# Patient Record
Sex: MALE | Race: WHITE | NOT HISPANIC OR LATINO | Employment: PART TIME
[De-identification: names, ages, dates, MRNs, and addresses within clinical notes are randomized per-mention and may not be internally consistent; named-entity substitution may affect disease eponyms.]

---

## 2017-08-11 ENCOUNTER — RECORDS - HEALTHEAST (OUTPATIENT)
Dept: ADMINISTRATIVE | Facility: OTHER | Age: 67
End: 2017-08-11

## 2017-08-11 LAB
LAB AP CHARGES (HE HISTORICAL CONVERSION): NORMAL
PATH REPORT.COMMENTS IMP SPEC: NORMAL
PATH REPORT.FINAL DX SPEC: NORMAL
PATH REPORT.GROSS SPEC: NORMAL
PATH REPORT.MICROSCOPIC SPEC OTHER STN: NORMAL
PATH REPORT.RELEVANT HX SPEC: NORMAL
RESULT FLAG (HE HISTORICAL CONVERSION): NORMAL

## 2018-11-28 ENCOUNTER — RECORDS - HEALTHEAST (OUTPATIENT)
Dept: LAB | Facility: CLINIC | Age: 68
End: 2018-11-28

## 2018-11-28 LAB
ALBUMIN SERPL-MCNC: 3.7 G/DL (ref 3.5–5)
ALP SERPL-CCNC: 70 U/L (ref 45–120)
ALT SERPL W P-5'-P-CCNC: 39 U/L (ref 0–45)
ANION GAP SERPL CALCULATED.3IONS-SCNC: 7 MMOL/L (ref 5–18)
AST SERPL W P-5'-P-CCNC: 29 U/L (ref 0–40)
BILIRUB SERPL-MCNC: 0.8 MG/DL (ref 0–1)
BUN SERPL-MCNC: 22 MG/DL (ref 8–22)
CALCIUM SERPL-MCNC: 9.6 MG/DL (ref 8.5–10.5)
CHLORIDE BLD-SCNC: 105 MMOL/L (ref 98–107)
CHOLEST SERPL-MCNC: 274 MG/DL
CO2 SERPL-SCNC: 30 MMOL/L (ref 22–31)
CREAT SERPL-MCNC: 0.97 MG/DL (ref 0.7–1.3)
FASTING STATUS PATIENT QL REPORTED: ABNORMAL
GFR SERPL CREATININE-BSD FRML MDRD: >60 ML/MIN/1.73M2
GLUCOSE BLD-MCNC: 89 MG/DL (ref 70–125)
HDLC SERPL-MCNC: 58 MG/DL
LDLC SERPL CALC-MCNC: 184 MG/DL
POTASSIUM BLD-SCNC: 4.5 MMOL/L (ref 3.5–5)
PROT SERPL-MCNC: 6.9 G/DL (ref 6–8)
PSA SERPL-MCNC: 4.6 NG/ML (ref 0–4.5)
SODIUM SERPL-SCNC: 142 MMOL/L (ref 136–145)
TRIGL SERPL-MCNC: 161 MG/DL

## 2018-12-07 ENCOUNTER — AMBULATORY - HEALTHEAST (OUTPATIENT)
Dept: PHYSICAL MEDICINE AND REHAB | Facility: CLINIC | Age: 68
End: 2018-12-07

## 2018-12-07 DIAGNOSIS — M54.31 SCIATICA OF RIGHT SIDE: ICD-10-CM

## 2018-12-10 ENCOUNTER — HOSPITAL ENCOUNTER (OUTPATIENT)
Dept: PHYSICAL MEDICINE AND REHAB | Facility: CLINIC | Age: 68
Discharge: HOME | End: 2018-12-10
Attending: PHYSICAL MEDICINE & REHABILITATION

## 2018-12-10 DIAGNOSIS — G89.29 CHRONIC BILATERAL LOW BACK PAIN WITH RIGHT-SIDED SCIATICA: ICD-10-CM

## 2018-12-10 DIAGNOSIS — M54.41 CHRONIC BILATERAL LOW BACK PAIN WITH RIGHT-SIDED SCIATICA: ICD-10-CM

## 2018-12-10 DIAGNOSIS — M47.816 LUMBAR FACET ARTHROPATHY: ICD-10-CM

## 2018-12-10 DIAGNOSIS — M47.816 LUMBAR FACET JOINT SYNDROME: ICD-10-CM

## 2018-12-10 DIAGNOSIS — M51.369 ANNULAR TEAR OF LUMBAR DISC: ICD-10-CM

## 2018-12-10 ASSESSMENT — MIFFLIN-ST. JEOR: SCORE: 1548.71

## 2018-12-11 ENCOUNTER — COMMUNICATION - HEALTHEAST (OUTPATIENT)
Dept: PHYSICAL MEDICINE AND REHAB | Facility: CLINIC | Age: 68
End: 2018-12-11

## 2018-12-11 DIAGNOSIS — G89.29 CHRONIC BILATERAL LOW BACK PAIN WITH RIGHT-SIDED SCIATICA: ICD-10-CM

## 2018-12-11 DIAGNOSIS — M54.41 CHRONIC BILATERAL LOW BACK PAIN WITH RIGHT-SIDED SCIATICA: ICD-10-CM

## 2018-12-12 ENCOUNTER — RECORDS - HEALTHEAST (OUTPATIENT)
Dept: ADMINISTRATIVE | Facility: OTHER | Age: 68
End: 2018-12-12

## 2019-01-14 ENCOUNTER — COMMUNICATION - HEALTHEAST (OUTPATIENT)
Dept: PHYSICAL MEDICINE AND REHAB | Facility: CLINIC | Age: 69
End: 2019-01-14

## 2019-01-24 ENCOUNTER — HOSPITAL ENCOUNTER (OUTPATIENT)
Dept: PHYSICAL MEDICINE AND REHAB | Facility: CLINIC | Age: 69
Discharge: HOME | End: 2019-01-24
Attending: PAIN MEDICINE

## 2019-01-24 ENCOUNTER — HOSPITAL ENCOUNTER (OUTPATIENT)
Dept: PHYSICAL MEDICINE AND REHAB | Facility: CLINIC | Age: 69
Discharge: HOME | End: 2019-01-24
Attending: NURSE PRACTITIONER

## 2019-01-24 DIAGNOSIS — M54.41 CHRONIC BILATERAL LOW BACK PAIN WITH RIGHT-SIDED SCIATICA: ICD-10-CM

## 2019-01-24 DIAGNOSIS — M47.816 LUMBAR FACET ARTHROPATHY: ICD-10-CM

## 2019-01-24 DIAGNOSIS — M51.369 ANNULAR TEAR OF LUMBAR DISC: ICD-10-CM

## 2019-01-24 DIAGNOSIS — M99.83 OTHER BIOMECHANICAL LESIONS OF LUMBAR REGION: ICD-10-CM

## 2019-01-24 DIAGNOSIS — M48.061 LUMBAR FORAMINAL STENOSIS: ICD-10-CM

## 2019-01-24 DIAGNOSIS — G89.29 CHRONIC BILATERAL LOW BACK PAIN WITH RIGHT-SIDED SCIATICA: ICD-10-CM

## 2019-01-24 DIAGNOSIS — M51.369 BULGE OF LUMBAR DISC WITHOUT MYELOPATHY: ICD-10-CM

## 2019-01-24 ASSESSMENT — MIFFLIN-ST. JEOR: SCORE: 1548.71

## 2019-01-25 ENCOUNTER — HOSPITAL ENCOUNTER (OUTPATIENT)
Dept: PHYSICAL MEDICINE AND REHAB | Facility: CLINIC | Age: 69
Discharge: HOME | End: 2019-01-25
Attending: PAIN MEDICINE

## 2019-01-25 ENCOUNTER — COMMUNICATION - HEALTHEAST (OUTPATIENT)
Dept: PHYSICAL MEDICINE AND REHAB | Facility: CLINIC | Age: 69
End: 2019-01-25

## 2019-01-25 DIAGNOSIS — M47.816 FACET ARTHROPATHY, LUMBAR: ICD-10-CM

## 2019-02-08 ENCOUNTER — COMMUNICATION - HEALTHEAST (OUTPATIENT)
Dept: PHYSICAL MEDICINE AND REHAB | Facility: CLINIC | Age: 69
End: 2019-02-08

## 2019-02-08 ENCOUNTER — HOSPITAL ENCOUNTER (OUTPATIENT)
Dept: PHYSICAL MEDICINE AND REHAB | Facility: CLINIC | Age: 69
Discharge: HOME | End: 2019-02-08
Attending: PAIN MEDICINE

## 2019-02-08 DIAGNOSIS — M54.16 LUMBAR RADICULOPATHY: ICD-10-CM

## 2019-09-25 ENCOUNTER — RECORDS - HEALTHEAST (OUTPATIENT)
Dept: LAB | Facility: CLINIC | Age: 69
End: 2019-09-25

## 2019-09-25 LAB — PSA SERPL-MCNC: 5.1 NG/ML (ref 0–4.5)

## 2020-04-28 ENCOUNTER — RECORDS - HEALTHEAST (OUTPATIENT)
Dept: LAB | Facility: CLINIC | Age: 70
End: 2020-04-28

## 2020-04-28 LAB
C REACTIVE PROTEIN LHE: 0.3 MG/DL (ref 0–0.8)
ERYTHROCYTE [SEDIMENTATION RATE] IN BLOOD BY WESTERGREN METHOD: 5 MM/HR (ref 0–15)
RHEUMATOID FACT SERPL-ACNC: <15 IU/ML (ref 0–30)
URATE SERPL-MCNC: 4.9 MG/DL (ref 3–8)

## 2020-05-01 LAB
ANA SER QL: 0.2 U
LYME TOTAL ANTIBODY - HISTORICAL: 0.03 INDEX VALUE

## 2020-08-03 ENCOUNTER — COMMUNICATION - HEALTHEAST (OUTPATIENT)
Dept: SCHEDULING | Facility: CLINIC | Age: 70
End: 2020-08-03

## 2020-10-13 ENCOUNTER — RECORDS - HEALTHEAST (OUTPATIENT)
Dept: LAB | Facility: CLINIC | Age: 70
End: 2020-10-13

## 2020-10-13 LAB
ALBUMIN SERPL-MCNC: 3.9 G/DL (ref 3.5–5)
ALP SERPL-CCNC: 77 U/L (ref 45–120)
ALT SERPL W P-5'-P-CCNC: 54 U/L (ref 0–45)
ANION GAP SERPL CALCULATED.3IONS-SCNC: 8 MMOL/L (ref 5–18)
AST SERPL W P-5'-P-CCNC: 38 U/L (ref 0–40)
BILIRUB SERPL-MCNC: 0.6 MG/DL (ref 0–1)
BUN SERPL-MCNC: 20 MG/DL (ref 8–28)
CALCIUM SERPL-MCNC: 9.8 MG/DL (ref 8.5–10.5)
CHLORIDE BLD-SCNC: 103 MMOL/L (ref 98–107)
CO2 SERPL-SCNC: 30 MMOL/L (ref 22–31)
CREAT SERPL-MCNC: 1.05 MG/DL (ref 0.7–1.3)
GFR SERPL CREATININE-BSD FRML MDRD: >60 ML/MIN/1.73M2
GLUCOSE BLD-MCNC: 89 MG/DL (ref 70–125)
POTASSIUM BLD-SCNC: 5 MMOL/L (ref 3.5–5)
PROT SERPL-MCNC: 7.1 G/DL (ref 6–8)
SODIUM SERPL-SCNC: 141 MMOL/L (ref 136–145)

## 2021-02-02 ENCOUNTER — HOSPITAL ENCOUNTER (OUTPATIENT)
Dept: PHYSICAL MEDICINE AND REHAB | Facility: CLINIC | Age: 71
Discharge: HOME | End: 2021-02-02
Attending: PHYSICAL MEDICINE & REHABILITATION

## 2021-02-02 DIAGNOSIS — G89.29 CHRONIC BILATERAL LOW BACK PAIN WITHOUT SCIATICA: ICD-10-CM

## 2021-02-02 DIAGNOSIS — M54.50 CHRONIC BILATERAL LOW BACK PAIN WITHOUT SCIATICA: ICD-10-CM

## 2021-02-02 DIAGNOSIS — M47.816 LUMBAR FACET JOINT SYNDROME: ICD-10-CM

## 2021-02-02 DIAGNOSIS — M47.816 LUMBAR FACET ARTHROPATHY: ICD-10-CM

## 2021-02-02 RX ORDER — PRIMIDONE 50 MG/1
50 TABLET ORAL 2 TIMES DAILY
Status: SHIPPED | COMMUNITY
Start: 2020-08-24

## 2021-02-02 ASSESSMENT — MIFFLIN-ST. JEOR: SCORE: 1600.87

## 2021-02-04 ENCOUNTER — HOSPITAL ENCOUNTER (OUTPATIENT)
Dept: PHYSICAL MEDICINE AND REHAB | Facility: CLINIC | Age: 71
Discharge: HOME | End: 2021-02-04
Attending: PHYSICAL MEDICINE & REHABILITATION

## 2021-02-04 DIAGNOSIS — M54.50 CHRONIC BILATERAL LOW BACK PAIN WITHOUT SCIATICA: ICD-10-CM

## 2021-02-04 DIAGNOSIS — M47.816 LUMBAR FACET JOINT SYNDROME: ICD-10-CM

## 2021-02-04 DIAGNOSIS — M54.16 LUMBAR RADICULITIS: ICD-10-CM

## 2021-02-04 DIAGNOSIS — M47.816 LUMBAR FACET ARTHROPATHY: ICD-10-CM

## 2021-02-04 DIAGNOSIS — G89.29 CHRONIC BILATERAL LOW BACK PAIN WITHOUT SCIATICA: ICD-10-CM

## 2021-02-04 RX ORDER — ROPINIROLE 1 MG/1
1 TABLET, FILM COATED ORAL 3 TIMES DAILY
Status: SHIPPED | COMMUNITY
Start: 2021-01-15

## 2021-02-04 RX ORDER — DICLOFENAC SODIUM 1 MG/ML
SOLUTION/ DROPS OPHTHALMIC
Status: SHIPPED | COMMUNITY
Start: 2020-12-05

## 2021-02-04 RX ORDER — DORZOLAMIDE HYDROCHLORIDE AND TIMOLOL MALEATE 20; 5 MG/ML; MG/ML
SOLUTION/ DROPS OPHTHALMIC
Status: SHIPPED | COMMUNITY
Start: 2020-10-23

## 2021-02-18 ENCOUNTER — COMMUNICATION - HEALTHEAST (OUTPATIENT)
Dept: PHYSICAL MEDICINE AND REHAB | Facility: CLINIC | Age: 71
End: 2021-02-18

## 2021-02-26 ENCOUNTER — COMMUNICATION - HEALTHEAST (OUTPATIENT)
Dept: PHYSICAL MEDICINE AND REHAB | Facility: CLINIC | Age: 71
End: 2021-02-26

## 2021-03-10 ENCOUNTER — HOSPITAL ENCOUNTER (OUTPATIENT)
Dept: PHYSICAL MEDICINE AND REHAB | Facility: CLINIC | Age: 71
Discharge: HOME | End: 2021-03-10
Attending: PHYSICAL MEDICINE & REHABILITATION

## 2021-03-10 DIAGNOSIS — M54.16 LUMBAR RADICULITIS: ICD-10-CM

## 2021-03-30 ENCOUNTER — HOSPITAL ENCOUNTER (OUTPATIENT)
Dept: PHYSICAL MEDICINE AND REHAB | Facility: CLINIC | Age: 71
Discharge: HOME | End: 2021-03-30
Attending: PHYSICAL MEDICINE & REHABILITATION

## 2021-03-30 DIAGNOSIS — M54.50 CHRONIC BILATERAL LOW BACK PAIN WITHOUT SCIATICA: ICD-10-CM

## 2021-03-30 DIAGNOSIS — M47.816 LUMBAR FACET ARTHROPATHY: ICD-10-CM

## 2021-03-30 DIAGNOSIS — M47.816 LUMBAR FACET JOINT SYNDROME: ICD-10-CM

## 2021-03-30 DIAGNOSIS — M54.16 LUMBAR RADICULITIS: ICD-10-CM

## 2021-03-30 DIAGNOSIS — G60.9 PERIPHERAL NEUROPATHY, IDIOPATHIC: ICD-10-CM

## 2021-03-30 DIAGNOSIS — G89.29 CHRONIC BILATERAL LOW BACK PAIN WITHOUT SCIATICA: ICD-10-CM

## 2021-03-30 RX ORDER — GABAPENTIN 300 MG/1
CAPSULE ORAL
Qty: 270 CAPSULE | Refills: 1 | Status: SHIPPED | OUTPATIENT
Start: 2021-03-30

## 2021-05-12 ENCOUNTER — RECORDS - HEALTHEAST (OUTPATIENT)
Dept: PHYSICAL MEDICINE AND REHAB | Facility: CLINIC | Age: 71
End: 2021-05-12

## 2021-05-12 ENCOUNTER — RECORDS - HEALTHEAST (OUTPATIENT)
Dept: ADMINISTRATIVE | Facility: OTHER | Age: 71
End: 2021-05-12

## 2021-05-12 DIAGNOSIS — M54.2 CERVICAL PAIN: ICD-10-CM

## 2021-05-24 ENCOUNTER — COMMUNICATION - HEALTHEAST (OUTPATIENT)
Dept: PHYSICAL MEDICINE AND REHAB | Facility: CLINIC | Age: 71
End: 2021-05-24

## 2021-05-24 ENCOUNTER — HOSPITAL ENCOUNTER (OUTPATIENT)
Dept: PHYSICAL MEDICINE AND REHAB | Facility: CLINIC | Age: 71
Discharge: HOME | End: 2021-05-24
Attending: PHYSICAL MEDICINE & REHABILITATION

## 2021-05-24 DIAGNOSIS — G89.29 CHRONIC BILATERAL LOW BACK PAIN WITH RIGHT-SIDED SCIATICA: ICD-10-CM

## 2021-05-24 DIAGNOSIS — M54.2 NECK PAIN: ICD-10-CM

## 2021-05-24 DIAGNOSIS — M47.812 ARTHROPATHY OF CERVICAL FACET JOINT: ICD-10-CM

## 2021-05-24 DIAGNOSIS — M54.41 CHRONIC BILATERAL LOW BACK PAIN WITH RIGHT-SIDED SCIATICA: ICD-10-CM

## 2021-05-24 RX ORDER — CELECOXIB 100 MG/1
CAPSULE ORAL
Qty: 60 CAPSULE | Refills: 1 | Status: SHIPPED | OUTPATIENT
Start: 2021-05-24

## 2021-05-24 ASSESSMENT — MIFFLIN-ST. JEOR: SCORE: 1518.76

## 2021-05-25 ENCOUNTER — RECORDS - HEALTHEAST (OUTPATIENT)
Dept: ADMINISTRATIVE | Facility: CLINIC | Age: 71
End: 2021-05-25

## 2021-05-26 ENCOUNTER — RECORDS - HEALTHEAST (OUTPATIENT)
Dept: ADMINISTRATIVE | Facility: CLINIC | Age: 71
End: 2021-05-26

## 2021-05-27 ENCOUNTER — RECORDS - HEALTHEAST (OUTPATIENT)
Dept: ADMINISTRATIVE | Facility: CLINIC | Age: 71
End: 2021-05-27

## 2021-06-02 VITALS — BODY MASS INDEX: 27.2 KG/M2 | HEIGHT: 68 IN | WEIGHT: 179.5 LBS

## 2021-06-02 VITALS — BODY MASS INDEX: 27.28 KG/M2 | WEIGHT: 179.4 LBS

## 2021-06-02 VITALS — WEIGHT: 173.2 LBS | BODY MASS INDEX: 26.33 KG/M2

## 2021-06-02 VITALS — WEIGHT: 179.5 LBS | HEIGHT: 68 IN | BODY MASS INDEX: 27.2 KG/M2

## 2021-06-05 VITALS — BODY MASS INDEX: 28.95 KG/M2 | WEIGHT: 191 LBS | HEIGHT: 68 IN

## 2021-06-14 NOTE — PATIENT INSTRUCTIONS - HE
Bilateral L4-5 and L5-S1 facet joint injections have been ordered today.      Please note that this injection uses cortisone.  The cortisone may somewhat weaken the immune system.  It is unknown how much the immune system is weakened.  It is unknown if it is weakened to the point that you may be more likely to get the COVID-19 virus, or if you do get the COVID-19 virus, if you would be sicker than you would have been if you had not had the cortisone injection.  If you do not wish to proceed with the injection, please let the nurse/physician know and do NOT schedule the injection.    Please note that since your immune system is weakened from the cortisone, having a flu vaccine/shot or COVID-19 vaccine/shot may be less effective if you have this vaccine within 2 weeks from your cortisone injection.  It is advised to wait 2 weeks after your cortisone injection to have the flu shot (or if you have the flu shot first, wait 2 weeks before you have the cortisone injection).    Please schedule this injection at least 1 week  from now to allow time for insurance prior authorization.  On the day of your injection, you cannot be sick or taking antibiotics.  If you become sick and are prescribed, please call the clinic so your injection can be rescheduled for once you have completed your antibiotics.  You will need to bring a  with you for your injection.   If you have any questions or concerns prior to your injection, please do not hesitate to call the nurse navigation line at [phone] or contact Dr. Gold through [employer].

## 2021-06-14 NOTE — PROGRESS NOTES
Assessment:   Ruperto Gomez (AKA Kofi)  is a 70 y.o. y.o. male with past medical history significant for hypercholesterolemia, benign prostatic hypertrophy, anal/rectal fissure, osteoarthritis, polio (with left residual leg weakness/atrophy), tremors, restless leg syndrome who presents today for follow-up regarding acute flare of chronic bilateral low back pain.  The back pain is the main area of pain.  He does get some right radicular/sciatic type leg symptoms, but the main pain is in the back.  The patient reports that his pain feels the same as it did when he was last seen in December 2018.  He underwent bilateral L4-5 and L5-S1 facet joint injections in January 2019 reports that he essentially had resolution of his pain up until about a week ago when pain returned.  He is neurologically intact and without any red flag symptoms.    PSP:  Dr. Gold       Plan:     A shared decision making plan was used.  The patient's values and choices were respected.  The following represents what was discussed and decided upon by the physician and the patient.      1.  DIAGNOSTIC TESTS: The patient's MRI of the lumbar spine from December 30, 2018 was personally reviewed today by the physician with the physician performing her own interpretation.  Patient does have significant facet arthropathy seen at the L3-4, L4-5, and L5-S1 levels.  There is bilateral foraminal and lateral recess stenosis at the L5-S1 level with an L5-S1 annular disc tear.  There is a disc bulge at the L4-5 level which causes bilateral L4-5 lateral recess stenosis.  2.  PHYSICAL THERAPY: At the patient's last visit, he was given an order for physical therapy.  He states that he did not go.  Reports that he has gone to physical therapy in the past.  He is encouraged to continue doing his home exercise program on a regular basis.  He states that he is quite active.  3.  MEDICATIONS: At the last visit, he was offered Celebrex 100 mg twice a day as needed for  pain.  He states that he does not remember if he ended up taking this or not.  He states that he no longer takes this.  -He has discontinued the cyclobenzaprine and states that he has not taken this in the last 3 years.  -He can continue to take over-the-counter Advil as needed, which he takes sparingly.  4.  INTERVENTIONS: Patient would like repeat bilateral L4-5 and L5-S1 facet joint injections.  He had good relief with previous bilateral L4-5 and L5-S1 facet joint injections last performed on January 25, 2019.  An order was provided today for repeat injections.  He states that he is trying to leave to go out of town.  Dr. Gold asked for [place] authorization.  -The risk of having a cortisone injection during the pandemic were provided in the AVS and this was discussed with the patient during the discharge with the [facility].  5.  PATIENT EDUCATION:    -Patient asked what the purpose of physical therapy would be.  Dr. Gold explained that over the long-term, this is one of the best treatments to manage back pain.  It helps to maintain good spine health by maintaining core strength, range of motion and flexibility.  -All of his questions were answered to his satisfaction today.  He was in agreement the treatment plan.  6.  FOLLOW-UP: Patient will follow up next week for the injections with Dr. Gold.  If authorization is received sooner, he will be called to be scheduled sooner.  If there are any questions/concerns or any significant worsening of pain prior to that time, the patient is asked to call the clinic via the nurse navigation line or via [employer].     A total of 35 minutes was spent in the care of this patient.    Subjective:     Ruperto IZAGUIRRE Jason  (AKA Kofi) is a 70 y.o. male who presents today for follow-up regarding return of bilateral low back pain without radicular/sciatic type leg symptoms.  He is status post bilateral L4-5 and L5-S1 facet joint injections performed on January 25, 2019.  He reports that he  had good relief up until about a week ago when pain returned.  States that the pain goes across his low back.  He says is difficult for him to describe, but he says that it can be very painful.  He rates his pain today at a 4 out of 10.  At worst it is a 9 out of 10.  At best it is a 4 out of 10.  His pain is worse with bending and working.  He does feel better with laying flat.  He did go to see the chiropractor which provided some temporary relief, but he still has pain at this time.  He denies any new symptoms.  He can get some pain that radiates into the right anterior thigh stopping at the knee.  This is very reminiscent of the radicular pain that he had previously, but he states it is not nearly as intense as it was when he required the epidural steroid injection in February 2019.  He is currently taking Motrin as needed for pain.  He says that he rarely takes medications as he likes to avoid medications if at all possible.  He is very interested in having a repeat facet joint injection, as he did have significant relief back in 2019.    Past medical history is reviewed and is unchanged for any new medical diagnoses in the interim.      Family history is reviewed and is unchanged in the interim.      Review of Systems:    Pertinent negatives include no numbness, tingling, weakness, headaches, fevers, chills, unexplained weight loss, bowel incontinence, bladder incontinence, trips, stumbles, falls.  All others reviewed and are negative.     Objective:   CONSTITUTIONAL:  Vital signs as above.  No acute distress.  The patient is well nourished and well groomed.    PSYCHIATRIC:  The patient is awake, alert, oriented to person, place and time.  The patient is answering questions appropriately with clear speech.  Normal affect.  SKIN:  Skin over the face, posterior torso, bilateral upper and lower extremities is clean, dry, intact without rashes.  MUSCULOSKELETAL:  Gait is non-antalgic.  The patient is able to heel  "and toe walk without any difficulty.  No significant tenderness over the lateral lumbar paraspinal muscles.  He reports some mild discomfort with lumbar flexion.  Mild pinching sensation on the left low back with lumbar extension.  Mild discomfort with bilateral lumbar sidebending.  No significant pain with bilateral trunk rotation.  No significant pain with bilateral facet loading (simultaneous extension rotation.    The patient has 5/5 strength for the bilateral hip flexors, knee flexors/extensors, ankle dorsiflexors/plantar flexors, ankle evertors/invertors.  He does not have full range of motion with the left ankle dorsiflexors/plantar flexors, \"evertors/invertors, but he can maintain a contraction against resistance.  NEUROLOGICAL: 2/4 patellar, with absent medial hamstring, and absent achilles reflexes which are symmetric bilaterally.  No ankle clonus bilaterally.  Sensation to light touch is intact in the bilateral L4, L5, and S1 dermatomes.           "

## 2021-06-15 NOTE — TELEPHONE ENCOUNTER
"PSP:  Dr. Gold  Last clinic visit:  2/2/2021 OV; 2/4/2021 B/L L4-5, L5-S1 facet joint injections  Reason for call: 2 week F/U call  Clinical information:  Call placed to check on status 2 weeks after injections. Pt reports he is \"not too bad. It doesn't seem quite as good as the first time we did this. I'm still having aching pain in my leg\". Explained that this injection was to target his low back pain. Pt states his low back pain is 85-90% better at this time. Order is also in place for Right L4-5 TFESI. Discussed this with patient and answered questions.   Pt would like to proceed with recommended injection. Injection requirements reviewed. Pt stated understanding. Pt states he hasn't been called to get his COVID vaccine yet but if he does he will call the [facility] to notify us and make sure that the timing of his vaccine and injection are appropriate.   Advice given to patient: Advised pt ok to schedule. Unable to get through to scheduling at this time. Message sent to schedulers to reach out to the pt to make appt.   Provider to address: AMADEO      "

## 2021-06-15 NOTE — TELEPHONE ENCOUNTER
Per discussion and clarification with STEVE Burrell to keep patient scheduled for the LISA on 3/10/21 and continue with COVID vaccination scheduled for 3/24.     ~ Patient's last lumbar MRI was not 2012, but at [facility] on  12/8/2018

## 2021-06-15 NOTE — PATIENT INSTRUCTIONS - HE
Follow-up phone call from a nurse from the [facility] in 2 weeks to discuss injection outcome and determine care plan going forward.    You may schedule the right L4-5 epidural steroid injection to be completed any time after 2 weeks from today (2/18/21 or later).       DISCHARGE INSTRUCTIONS    During office hours (8:00 a.m.- 4:00 p.m.) questions or concerns may be answered  by calling [facility] Navigation Nurses at  [phone].  Messages received after hours will be returned the following business day.      In the case of an emergency, please dial 911 or seek assistance at the nearest Emergency Room/Urgent Care facility.     All Patients:    ? You may experience an increase in your symptoms for the first 2 days (It may take anywhere between 2 days- 2 weeks for the steroid to have maximum effect).    ? You may use ice on the injection site, as frequently as 20 minutes each hour if needed.    ? You may take your pain medicine.    ? You may continue taking your regular medication after your injection. If you have had a Medial Branch Block you may resume pain medication once your pain diary is completed.    ? You may shower. No swimming, tub bath or hot tub for 48 hours.  You may remove your bandaid/bandage as soon as you are home.    ? You may resume light activities, as tolerated.    ? Resume your usual diet as tolerated.    ? It is strongly advised that you do not drive for 1-3 hours post injection.    ? If you have had oral sedation:  Do not drive for 8 hours post injection.      ? If you have had IV sedation:  Do not drive for 24 hours post injection.  Do not operate hazardous machinery or make important personal/business decisions for 24 hours.      POSSIBLE STEROID SIDE EFFECTS (If steroid/cortisone was used for your procedure)    -If you experience these symptoms, it should only last for a short period      Swelling of the legs                Skin redness (flushing)       Mouth (oral) irritation      Blood sugar (glucose) levels              Sweats                      Mood changes    Headache    Sleeplessness    Weakened immune system for up to 14 days, which could increase the risk of reynaldo the COVID-19 virus and/or experiencing more severe symptoms of the disease, if exposed.    Decreased effectiveness of the flu vaccine if given within 2 weeks of the steroid.         POSSIBLE PROCEDURE SIDE EFFECTS  -Call the Spine Center if you are concerned    Increased Pain             Increased numbness/tingling        Nausea/Vomiting            Bruising/bleeding at site        Redness or swelling                                                Difficulty walking        Weakness             Fever greater than 100.5    *In the event of a severe headache after an epidural steroid injection that is relieved by lying down, please call the [facility] to speak with a clinical staff member*

## 2021-06-15 NOTE — TELEPHONE ENCOUNTER
PSP: Dr. Gold  Last clinic visit:  2/2/21; last in was 2019 with Aurea Simental  Reason for call: injections and COVID vaccine.  Clinical information:  States he had his 1st vaccination yesterday and is scheduled for the 2nd one on 3/24/21. Wants to make sure injection and vaccines are scheduled OK. Last MRI was 2012; states he is very claustrophobic.   Advice given to patient: Explained to patient that it is preferable to wait 2 weeks after the 2nd COVID-19 vaccination before getting the steroid injection. Also explained that if pain becomes intolerable we would be able to do injection at least 1 week after the 1st vaccine and 1 week before 2nd vaccine. He would like to keep appointments scheduled this way just as long as the steroid won't lessen the vaccination. Did explain that he should follow up with [facility] after this next injection to discuss response to both injections and determine next steps in care based on results.   Provider to address: AMADEO

## 2021-06-15 NOTE — PATIENT INSTRUCTIONS - HE
Follow-up visit in 2 weeks with Dr. Gold to discuss injection outcome and determine care plan going forward.     DISCHARGE INSTRUCTIONS    During office hours (8:00 a.m.- 4:00 p.m.) questions or concerns may be answered  by calling [facility] Navigation Nurses at  [phone].  Messages received after hours will be returned the following business day.      In the case of an emergency, please dial 911 or seek assistance at the nearest Emergency Room/Urgent Care facility.     All Patients:    ? You may experience an increase in your symptoms for the first 2 days (It may take anywhere between 2 days- 2 weeks for the steroid to have maximum effect).    ? You may use ice on the injection site, as frequently as 20 minutes each hour if needed.    ? You may take your pain medicine.    ? You may continue taking your regular medication after your injection. If you have had a Medial Branch Block you may resume pain medication once your pain diary is completed.    ? You may shower. No swimming, tub bath or hot tub for 48 hours.  You may remove your bandaid/bandage as soon as you are home.    ? You may resume light activities, as tolerated.    ? Resume your usual diet as tolerated.    ? It is strongly advised that you do not drive for 1-3 hours post injection.    ? If you have had oral sedation:  Do not drive for 8 hours post injection.      ? If you have had IV sedation:  Do not drive for 24 hours post injection.  Do not operate hazardous machinery or make important personal/business decisions for 24 hours.      POSSIBLE STEROID SIDE EFFECTS (If steroid/cortisone was used for your procedure)    -If you experience these symptoms, it should only last for a short period      Swelling of the legs                Skin redness (flushing)       Mouth (oral) irritation     Blood sugar (glucose) levels              Sweats                      Mood changes    Headache    Sleeplessness    Weakened immune system for up to 14 days,  which could increase the risk of reynaldo the COVID-19 virus and/or experiencing more severe symptoms of the disease, if exposed.    Decreased effectiveness of the flu vaccine if given within 2 weeks of the steroid.         POSSIBLE PROCEDURE SIDE EFFECTS  -Call the Sp[facility] Center if you are concerned    Increased Pain             Increased numbness/tingling        Nausea/Vomiting            Bruising/bleeding at site        Redness or swelling                                                Difficulty walking        Weakness             Fever greater than 100.5    *In the event of a severe headache after an epidural steroid injection that is relieved by lying down, please call the [facility] to speak with a clinical staff member*

## 2021-06-16 NOTE — PATIENT INSTRUCTIONS - HE
Kofi,      A letter for your work restrictions was placed today.  These can be in effect for 2 months and then you will need to get a letter if you need permanent restrictions after that.  The letter can come from your primary care physician or you can check with your employer if you can come from your chiropractor.      Gabapentin (nerve pain medication) was prescribed today to try to help with some of the feet pain/numbness/tingling/burning pain.  Please use the chart to increase the dose to an amount that controls your pain (do not exceed 3 tablets three times a day).  If you have any questions on how to increase the dose or any side effects to this medication, please call the clinic.    Gabapentin 300mg Dosing Chart    DATE  MORNING AFTERNOON BEDTIME    Day 1 0 0 1    Day 2 0 0 1    Day 3 0 0 1    Day 4 1 0 1    Day 5 1 0 1    Day 6 1 0 1    Day 7 1 1 1    Day 8 1 1 1    Day 9 1 1 1    Day 10 1 1 2    Day 11 1 1 2    Day 12 1 1 2    Day 13 2 1 2    Day 14 2 1 2    Day 15 2 1 2    Day 16 2 2 2    Day 17 2 2 2    Day 18 2 2 2    Day 19 2 2 3    Day 20 2 2 3    Day 21 2 2 3    Day 22 3 2 3    Day 23 3 2 3    Day 24 3 2 3    Day 25 3 3 3    Day 26 3 3 3    Day 27 3 3 3     Continue medication, taking 3 capsules three times daily    Please call if you have any questions regarding how to take your medication  Clinic Phone # [phone]    Please do not hesitate to contact the clinic at [phone] if you have any questions/concerns or any worsening of your pain.  You are also welcome to contact Dr. Gold via [employer].

## 2021-06-16 NOTE — PROGRESS NOTES
Assessment:   Ruperto Gomez (AKINDIA Kirby) is a 70 y.o. y.o. male with past medical history significant for hypercholesterolemia, benign prostatic hypertrophy, inguinal rectal fissure, osteoarthritis, polio (with left residual leg weakness/atrophy), tremors, restless leg syndrome who presents today for follow-up regarding acute flare of chronic bilateral low back pain with some right radicular/sciatic type leg symptoms.  However the back pain is worse than the leg pain.  He is status post bilateral L4-5 and L5-S1 facet joint injections on February 4 of 2021 which did help relieve the back pain.  He is status post bilateral L4-5 transforaminal epidural steroid injections on March 10 of 2021.  This significant relieved the radicular/sciatic type leg pain.  At this time, he has noted overall improvement, though he continues to have some pain in the left side of his low back.    PSP:  Dr. Gold       Plan:     A shared decision making plan was used.  The patient's values and choices were respected.  The following represents what was discussed and decided upon by the physician and the patient.      1.  DIAGNOSTIC TESTS: The patient's MRI of the lumbar spine from December 30 of 2018 was personally reviewed today by the physician with the physician performing her own interpretation.  There is facet arthropathy seen at the L3-4, 4 5, L5-S1 levels.  There is bilateral L5-S1 foraminal stenosis and lateral recess stenosis with an L5-S1 annular tear.  There is a disc bulge at the L4-5 level which causes bilateral L4-5 lateral recess stenosis.  2.  PHYSICAL THERAPY: The patient did not go to physical therapy previously, stating that he is active and does exercise on a regular basis on his own.    3.  MEDICATIONS:    Discussed Gabapentin/Neurontin (nerve pain medication) mechanism of action as well as potential side effects (drowsiness/dizziness) with the patient.  The patient wanted to try this medication so Gabapentin 300 mg was  prescribed.  A chart was given with how to increase the dose to a maximum of 3 tablets three times a day.  The lowest therapeutic dose should be used.   The patient is asked to call the clinic if there are any side effects to the Gabapentin or if questions arise as to how to increase the dose.  If the 300 mg capsule is too strong for him, he can be decreased to gabapentin 100 mg capsules and then titrated up to a maximum of 300 mg 3 times a day to start.  Number 270 capsules with 1 refill can be given.  -He can continue to take over-the-counter Advil as needed.  -He had previously been prescribed Celebrex 100 mg twice a day as needed for pain, but he does not remember if he ever took this in the past or not.  -He has discontinued the cyclobenzaprine, stating it was not needed.  4.  INTERVENTIONS: No further injections are necessary at this time.  Repeat injections can be considered in the future as needed.  5.  PATIENT EDUCATION:    -The patient was requesting permanent restrictions for his low back pain.  Dr. Gold explained the permanent restrictions are not given by this clinic unless patient has completed a functional capacity evaluation, especially given that he did not go to physical therapy previously.  Functional capacity evaluations are not covered by insurance and are typically a  $600-700 out-of-pocket cost.  Given that he has not worked in almost a year, these restrictions can be in place for 2 months to allow him to acclimate to his job.  After that if he is requiring continued restrictions, he can ask his primary care physician for permanent restrictions.   He was asking if a chiropractor could write for restrictions.  Dr. Gold is not sure if the employer would accept this or not.  He is welcome to discuss this with his employer.  -A letter that states that he may return to light duty with a 15 pound work restriction to be in effect for 2 months was given today.  Again he was told that if he needs  permanent restrictions, he can request this from a different provider, but this will not be provided through this clinic without a functional capacity evaluation.  6.  FOLLOW-UP: He is welcome to follow-up as needed.  If there are any questions/concerns or any significant worsening of pain prior to that time, the patient is asked to call the clinic via the nurse navigation line or via [employer].     Subjective:     Ruperto Gomez (AKA Kofi) is a 70 y.o. male who presents today for follow-up regarding chronic bilateral low back pain with bilateral radicular/sciatic type leg symptoms.  The patient is status post bilateral L4-5 transforaminal epidural steroid injections on March 10 of 2021.  He is reporting essentially complete resolution of his leg pain since that time.  He reports that his back pain remains better, though he still has some pain in the left side of his low back.  He rates his pain today at a 1 out of 10.  At worst it is a 3 out of 10.  At best is a 0 out of 10.  His pain is worse with walking and lifting.  He does feel better with resting.  He has been active with doing exercises on his own but has not done formal physical therapy in quite some time.  He denies any new symptoms at this time.  He is currently just managing his pain with ibuprofen.    He states that he will be going back to work on Friday.  He says that he has to lift heavy boxes that weigh between 35 to 40 pounds.  He has to do this fairly frequently.  He reports that in the past his coworkers have been helpful in lifting for him, but she states that most employers have left since the pandemic and he is worried about finding someone else to help him with this.  Reports that his employer has told him that unless he has a letter stating that he has restrictions, he will be expected to lift the boxes on a regular basis.  He is requesting permanent 15 pound lifting restriction today.    He is concerned about some numbness and tingling as well  as a burning sensation he has in his feet.    Past medical history is reviewed and is unchanged for any new medical diagnoses in the interim.      Family history is reviewed and is unchanged in the interim.      Review of Systems:  Positive for the sensation in the feet as described in the HPI..  Pertinent negatives include no leg numbness, tingling, weakness, headaches, fevers, chills, unexplained weight loss, bowel incontinence, bladder incontinence, trips, stumbles, falls.  All others reviewed and are negative.     Objective:   CONSTITUTIONAL:  Vital signs as above.  No acute distress.  The patient is well nourished and well groomed.    PSYCHIATRIC:  The patient is awake, alert, oriented to person, place and time.  The patient is answering questions appropriately with clear speech.  Normal affect.  SKIN:  Skin over the face, posterior torso, bilateral upper and lower extremities is clean, dry, intact without rashes.  MUSCULOSKELETAL:  Gait is non-antalgic.  The patient is able to heel and toe walk without any difficulty.  Mild tenderness over the bilateral lower lumbar paraspinal muscles.      He has 5/5 strength of bilateral hip flexors, knee flexors/extensors, ankle dorsiflexors/plantar flexors, goal evertors/invertors.  He does not have full range of motion of the left ankle evertors/invertors but can maintain a full contraction against resistance with these muscles.  NEUROLOGICAL: Trace patellar, medial hamstring, achilles reflexes which are symmetric bilaterally.  No ankle clonus bilaterally.  Sensation to light touch is intact in the bilateral L4, L5, and S1 dermatomes.

## 2021-06-17 NOTE — TELEPHONE ENCOUNTER
PSP:  Dr. Gold  Last clinic visit:  5/24/2021   Reason for call: Celebrex prescription  Clinical information:  Call received from pt. Pt would like prescription for Celebrex that was discussed with provider at appointment today to be sent to [facility] in [place], [place].   Chart reviewed. Provider recommended Celebrex 100 mg twice a day as needed for pain #60 with 1 refill. Order prepped. Emphasized that he should not take this medication in combination with Aleve or ibuprofen, but that he can alternative between Celebrex and ibuprofen/Aleve but not take them simultaneously.   Advice given to patient: Informed pt that his request will be sent to Dr. Gold.   Provider to address: Please approve prescription if appropriate.

## 2021-06-17 NOTE — PROGRESS NOTES
Assessment:   Ruperto Gomez (AKINDIA Kirby)  is a 70 y.o. y.o. male with past medical history significant for hypercholesterolemia, benign prostatic hypertrophy, inguinal rectal fissure, osteoarthritis, polio (with left residual leg weakness/atrophy), tremors, restless leg syndrome who presents today for follow-up regarding acute flare of right-sided neck pain without radicular arm symptoms.  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  He is neurologically intact and without any red flag symptoms.  He has no symptoms of an active infection.  He is without any myelopathic signs/symptoms.      PSP:  Dr. Gold     Plan:     A shared decision making plan was used.  The patient's values and choices were respected.  The following represents what was discussed and decided upon by the physician and the patient.      1.  DIAGNOSTIC TESTS:    -Patient's MRI of the cervical soft tissues performed at [facility] in May 2021 was personally reviewed today by the physician with physician performing her own interpretation.  This MRI is not optimized for the bony structures.  However he does have right C4-5 facet arthropathy with an inflammatory component.  There are degenerative changes with inflammation of the endplates of C4 and C5.  There is no significant central canal stenosis.  No other significant findings, but again this MRI is not optimized for the bony structures.  The images were shown to the patient the findings were explained using a spine model.  -I considered ordering labs, but he does not have any symptoms of infection nor is he at high risk for infection so I did not order labs today.  2.  PHYSICAL THERAPY: I offered the patient physical therapy to work on strengthening, stretching, postural changes as well as a trial of cervical traction.  He asked what the physical therapy would be, and why he would need to do physical therapy when he has already done it for  the low back.  I explained that physical therapy would teach him exercises for strengthening and stretching as well as postural training.  All of these things will help to decompress the cervical spine and hopefully decrease irritation of the discs and the facet joints by strengthening the muscles that support the neck.  I did explain that he will need to do these exercises over the long-term in order to prevent future flares of pain.  He verbalized understanding, stated that he already knows this.  3.  MEDICATIONS: The patient has previously been prescribed Celebrex, but he states he does not take this.  I did encourage him to try taking the Celebrex for the arthritis pain in his neck.  He did ask for a new prescription for this today.  Celebrex 100 mg, twice a day as needed for pain, number 60 tablets with 1 refill can be provided today.  He states that he gets his prescriptions through [facility], meaning that he checks this website to see where he can go to get the medication at the cheapest price.  He then picks it up at the pharmacy.  I have asked him to look this up on his own and then call us with where he would like the prescription to go and it can be sent (as written above).  Nurse navigation to please emphasize to him that the Celebrex medication should not be taken in combination with Aleve or ibuprofen.  He can alternate between Celebrex and ibuprofen/Aleve but not take them simultaneously.  -Patient states that he is no longer taking the cyclobenzaprine medication, indicating that he had side effects to it.  -He states that he never picked up the gabapentin medication because he was afraid of the side effects.  Reports that the primidone already gives him some side effects he is worried that the combination of gabapentin and primidone would give him further side effects.  4.  INTERVENTIONS: A right C4-5 facet joint injection can be considered if he fails to have relief with physical therapy for the neck.   Given the inflammation seen on the MRI, I do feel that a radiofrequency ablation procedure would be contraindicated for this.  The cortisone should help decrease inflammation from the inflammatory arthritis without the need for a radiofrequency ablation procedure which could irritate the surrounding tissues and worsen his pain.  -He did question why he cannot move forward with the injection right away.  I explained that his insurance requires him to try conservative treatment before moving forward with a cortisone injection.  He question why he would have to do physical therapy when he is already done it for the low back.  I explained the doing physical therapy for the low back would not treat the neck.  He needs to have treatment specifically for the neck before considering an injection for the neck.  -I did offer him further treatment for the left-sided low back pain, but he declined stating that he does not feel like anything needs to be done at this point.  5.  PATIENT EDUCATION:    -Discussed the diagnosis of the inflammatory arthritis with the patient.  I did explain the limitations of the MRI with him.  -I explained that the inflammation comes from the arthritis.  Sometimes inflammation can coming from infection, but given that he does not have any symptoms of infection, this seems less likely.  -All of his questions were answered to his satisfaction today.  He was in agreement the treatment plan.  6.  FOLLOW-UP: The patient will be called by nurse navigation in 4 weeks.  If he he is doing better at that time he is welcome to follow-up as needed.  If he is not doing better, the nurse can offer him the right C4-5 facet joint injection or he can return for a follow-up appointment.  If there are any questions/concerns or any significant worsening of pain prior to that time, the patient is asked to call the clinic via the nurse navigation line or via [employer].    I spent a total of 43 minutes in the care of this  "patient.    Subjective:     Ruperto Gomez (AKINDIA Kirby)  is a 70 y.o. male who presents today for follow-up regarding acute flare of right-sided neck pain that has been ongoing for the last month.  He denies any injury or specific activity that he thinks would have triggered the pain.  He denies any radiation of pain going down into the arm.  It starts just behind the ear and then radiates to the top of the shoulder, but it does not go distal past the top of the shoulder.  He denies any numbness tingling or weakness in the arm.  He has had a couple of episodes of the left side of his neck with activity, but he states that the majority of pain is on the right side.  Reports that the pain can be very severe, calling it \"brutal.\"  He states that he has seen a chiropractor for his neck and in the past the chiropractor manipulation has helped, but recently it does not seem to be helping.  He does note that his pain is a 4 out of 10 today.  At worst is a 10 out of 10.  At best is a 2 out of 10.  His pain is worse with sitting in a recliner chair.  He is not really sure why this would be, as he states it is a very comfortable chair.  Driving can aggravate the pain.  Reports that he has tried taking medication such as ibuprofen and Aleve, which typically would help in the past, but they are not providing any relief.  He does have headaches, but states that he had headaches prior to this neck pain starting.  He is wondering what can be done at this time.    He does state that his left low back still seems to bother him.  Reports that the left side did not respond as well as the right side to the injections.  But he states overall it is manageable.  He says he does not like the pain, but when he is offered further treatment for the low back he says \"I do not feel like we need to do anything.\"  He can stretch his back and it does feel little bit better.    Past medical history is reviewed and is unchanged for any new medical " diagnoses in the interim.      Family history is reviewed and is unchanged in the interim.        Review of Systems:  Positive for chronic headaches..  Pertinent negatives include no numbness, tingling, weakness, fevers, chills, unexplained weight loss, bowel incontinence, bladder incontinence, trips, stumbles, falls.  All others reviewed and are negative.     Objective:   CONSTITUTIONAL:  Vital signs as above.  No acute distress.  The patient is well nourished and well groomed.    PSYCHIATRIC:  The patient is awake, alert, oriented to person, place and time.  The patient is answering questions appropriately with clear speech.  Normal affect.  SKIN:  Skin over the face, neck bilateral upper extremities is clean, dry, intact without rashes.  MUSCULOSKELETAL: The patient has 5/5 strength for the bilateral shoulder abductors, elbow flexors/extensors, wrist extensors, finger flexors/abductors.  He does not have any significant pain with cervical flexion or cervical extension, but he says that today is a good day and typically cervical extension would aggravate his pain.  He does have some mild discomfort with bilateral cervical sidebending and bilateral cervical rotation.  No significant tenderness to palpation over the bilateral cervical paraspinal muscles or upper trapezius muscles.  NEUROLOGICAL: Absent biceps, brachioradialis, triceps reflexes bilaterally.  Negative Sanchez's bilaterally.  Sensation to light touch is intact in all of the digits of both upper extremities.

## 2021-06-17 NOTE — PATIENT INSTRUCTIONS - HE
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    An order for physical therapy has been provided today.  Someone will call you to schedule physical therapy or you can call [phone] to schedule physical therapy.  It will be very important for you to do your physical therapy exercises on a regular basis to decrease your pain and prevent future flares of pain.    The name of the medication that I recommend (that has been prescribed previously) is Celebrex 100 mg twice a day as needed for pain.  I would give you 60 tablets with 1 refill.  You may already have some of this medication at home.  You may want to check for this at home before you  a new prescription.  Once you have looked on [facility] where you can get this medication, please call us with the name and address of that pharmacy and we be happy to send a prescription through to that pharmacy.    A nurse will call you in 4 weeks to see how you are doing. Please do not hesitate to contact the clinic at [phone] if you have any questions/concerns or any worsening of your pain prior to that time.

## 2021-06-21 ENCOUNTER — COMMUNICATION - HEALTHEAST (OUTPATIENT)
Dept: PHYSICAL MEDICINE AND REHAB | Facility: CLINIC | Age: 71
End: 2021-06-21

## 2021-06-21 NOTE — LETTER
Letter by Lashawn Lange DO at      Author: Lashawn Lange DO Service: -- Author Type: --    Filed:  Date of Service:  Status: (Other)       March 30, 2021     Patient: Ruperto Gomez   YOB: 1950   Date of Visit: 3/30/2021       To Whom It May Concern:    It is my medical opinion that Ruperto Gomez may return to light duty immediately with the following restrictions: No lifting greater tahn 15 lbs.  To be in effect for 2 months and then re-evaluated..    If you have any questions or concerns, please don't hesitate to call.    Sincerely,        Lashawn Colvin DO   [facility]  [place], [place]  [phone]

## 2021-06-22 NOTE — PROGRESS NOTES
ASSESSMENT: Ruperto Gomez (AKA Kofi) is a 68 y.o. male  with a BMI of Body mass index is 27.29 kg/m . with past medical history significant for hypercholesterolemia, benign prostatic hypertrophy, anal/rectal fissure, osteoarthritis, polio (with left residual leg weakness/atrophy), tremors, restless leg syndrome who presents today for new patient evaluation of chronic bilateral low back pain with right radicular/sciatica type leg pain going into the anterior thigh stopping at approximately the knee or just past the knee.  The back pain is worse than the leg pain.  The etiology of pain is difficult to determine.  Lumbar facet joint syndrome is in the differential diagnosis given his lumbar facet arthropathy seen at multiple levels (L3-4, L4-5, L5-S1).  There is a slight annular tear of the L5-S1 disc, that could potentially be an etiology as well.  There is no significant central canal stenosis.  There is some foraminal stenosis seen at the L5-S1 level with lateral recess stenosis as well.  There is slight lateral recess stenosis at the L4-5 level.  The patient does not appear to have any focal neurologic deficits.  He is without any red flag symptoms.    JUAN MANUEL: 32%  WHO-5: 7 (the patient is not interested in behavioral health services)    PLAN:  A shared decision making model was used.  The patient's values and choices were respected.  The following represents what was discussed and decided upon by the physician and the patient.      1.  DIAGNOSTIC TESTS: The patient's MRI of the lumbar spine from December 30, 2018 was personally reviewed today by the physician with the physician performing her own interpretation.  The images were shown to the patient were explained.  Again the patient does have significant facet arthropathy seen at the L3-4, L4-5, L5-S1 levels.  There is bilateral foraminal and lateral recess stenosis at the L5-S1 level.  There is an annular tear of the L5-S1 disc.  There is a disc bulge at the L4-5  level which causes lateral recess stenosis bilaterally.  -No further diagnostic testing necessary at this time.  2.  PHYSICAL THERAPY: Patient would benefit from physical therapy and he wanted to go so an order was provided to the [facility].  He is very interested in trying lumbar traction.  Discussed that this can be tried in physical therapy.  If he does get relief with lumbar traction, he may benefit from an inversion table so that he can try traction on his own at home.  He was cautioned that he needs to be careful with using an inversion table, especially if there is no one else at home with him, as he would not want to get stuck upside down.  Discussed with the the patient that it would be very important for him to do the physical therapy exercises every day on a regular basis in order to decrease pain and prevent future episodes of pain.  3.  MEDICATIONS: The patient a prescription anti-inflammatory medication.  He was concerned with trying prescription strength naproxen, as he states that he does have a very sensitive stomach.  Offered that the patient try Celebrex.  The patient would like to try Celebrex.  Discussed with him that it may not be covered by his insurance.  He prefers to go through a different pharmacy anyways, as his pharmacy has very good price and other medications.  Dr. Gold offered to send this prescription to the pharmacy, however he states that Dr. Glod will not be able to send in the prescription, the pharmacy will have to contact Dr. Gold and explained the process for her submitting the prescription.  Dr. Gold encouraged the patient to have the pharmacy contact her nurse so that instructions can be given on how the prescription can be obtained by the patient.  Celebrex 100 mg 1 tablet twice a day as needed for pain would be prescribed.  If Celebrex  is too expensive, could try meloxicam 7.5 mg 1 tablet twice a day as needed for pain.  -While the Borden 2 inhibitors  "do not frequently cause upset stomach, it would not necessarily be a bad idea to take this medication with food/water if it is not contraindicated.  4.  INTERVENTIONS:   -Patient previously had an L4-5 interlaminar epidural steroid injection.  The patient was shown on the MRI and on a spine model where this injection was performed, however the patient to the physician \"so you do not know where exactly they did the injection?\"  Excision then explained again that she has the record from the injection and that it was performed at the L4-5 interlaminar space (again showed him on the MRI and level where this injection was performed).  - Discussed a diagnostic injections to help determine the etiology of his pain.  The patient would like to hold off on these for now.  If he fails to have relief with conservative treatment, then Dr. Gold would recommend starting with bilateral L3, L4, L5 medial branch/dorsal rami blocks to determine how much of the pain is coming from the lumbar facets.  -A right L4-5 transforaminal epidural steroid injection could be considered in the future if the leg pain continues to be problematic.  However at this time he states that his back pain is worse in the leg pain, which is why the recommendation would be to start with the medial branch blocks (which would target more of the back pain).  5.  PATIENT EDUCATION:    -Dr. Gold explained to the patient that the MRI does not show exactly where the pain is coming from.  MRI is frequently do not tell the physician exactly where the pain is coming from.  However most etiologies of pain will respond well to physical therapy.  Oftentimes injections play a role in helping to diagnostically determine where the pain is coming from.  Dr. Gold explained multiple times to the patient that she does not know exactly what is causing his pain based on his MRI alone.  Patient had a difficult time understanding this, despite the physician explaining it in " a couple of different ways to try and help him understand.  -Patient seem fixated on the idea that his pain is coming from a nerve.  Tried to explain to the patient that all pain comes from nerves relating pain signals to the brain, but it does not appear that he has a pinched nerve in his back that is causing the back pain.  He could have an irritated nerve that is causing the leg pain, but not a pinched nerve that would cause back pain.  Patient seemed to have a difficult time understanding this concept.  -Discussed the patient's lumbar facet arthropathy as a potential cause of pain.  -Discussed the annular tear as a potential cause of pain, however this is only a slight/subtle tear.  -Patient has questions about whether lumbar traction would help.  Discussed with the patient that some patients find the lumbar traction significantly relieves pain while other patients find that lumbar traction significantly aggravates/worsens pain.  -Patient was satisfied with the treatment plan.  6.  FOLLOW-UP:   Nurse navigation is asked to call the patient in 4 weeks.  If he is doing better at that time he can follow-up as needed.  If he is not doing better, the nurse can talk to about scheduling a follow-up appointment to discuss further treatment options, or he could move forward with the bilateral L3, L4, L5 medial branch/dorsal rami blocks.  He has any questions, concerns, or any significant worsening of his pain prior to that time he is encouraged to contact the clinic.    A total of 65 minutes was spent in the care of this patient.  Greater than 50% of the time was spent counseling, teaching, answering the patient's questions.      SUBJECTIVE:  Ruperto Gomez (AKA Kofi)  Is a 68 y.o. male who presents today for new patient evaluation of chronic bilateral low back pain with radicular/sciatica type pain.  Patient reports that he has had pain for many years.  He has had a bout of pain in the last 2-3 months that just has not  "gone away.  The patient reports that the back pain is worse than the leg pain at this time.  Occasionally has sciatica, but at this time he has pain going down the right anterior thigh stopping at about the knee are just past the knee.  It does not go into the calf or all the way to the ankle.  He denies any numbness or tingling.  He denies any weakness.  The patient has seen a chiropractor for the last 40 years.  He states that he does get some relief, however the manipulations do not cause pain to go completely away.  He is not done any physical therapy for his back.  He did have injections at [facility].  The patient states that at the time he did not think that it helped, but then later on he felt overall satisfied with the amount of pain relief he had.  Patient does have cyclobenzaprine, but he takes this mainly for upper back spasms and only about once or twice a year.  He has been trying ibuprofen recently but it does not provide much relief.  He is tried ice packs without much relief.  Patient's pain is worse with lifting 40 or more pounds.  He can get pain going across the back and into the lateral hips.  If he bends over to  a 5 gallon bucket this will also significantly increased pain.  The patient states that his primary care physician told him that his MRI is in \"a mess.\"  He would like to review the results and his treatment options.    Medications:  Reviewed and correct in the chart.      Allergies: Reviewed and however statins cause pain, fentanyl causes vomiting.    PMH:  Reviewed and significant for hypercholesterolemia, benign prostatic hypertrophy, anal/rectal fissure, osteoarthritis, polio prone with left residual leg weakness/atrophy), tremors, restless leg syndrome    PSH:  Reviewed and significant for bilateral trigger finger releases, tonsillectomy.    Family History:  Reviewed and significant for cancer in his mother, diabetes in his aunt, heart disease/acute myocardial infarction in his " "father.    Social History: The patient is single and works in retail sales.  He drinks 1-2 beers per year.  He says he rarely drinks alcohol.  He denies any tobacco or illicit drug use.    ROS: Positive for changes in vision related to an \"puckered retina.\"  Positive for irregular heartbeat, poor sleep quality (chronic), difficulty urinating secondary to his enlarged prostate which improves with taking his supplement), back pain, leg pain as stated in the HPI, chronic headaches.  Otherwise 13 systems reviewed are negative.  Please see the patient's intake questionnaire from today for details.      OBJECTIVE:  PHYSICAL EXAMINATION:    CONSTITUTIONAL:  Vital signs as above.  No acute distress.  The patient is well nourished and well groomed.  PSYCHIATRIC:  The patient is awake, alert, oriented to person, place, time and answering questions appropriately with clear speech.    SKIN:  Skin over the face, bilateral lower extremities, and posterior torso is clean, dry, intact without rashes.    GAIT:  Gait is non-antalgic.  The patient is able to heel and toe walk without significant difficulty.    STANDING EXAMINATION: The patient has mild tenderness to palpation over the lumbar paraspinal muscles.  The patient does report some pain with lumbar flexion and mild pain with lumbar extension.  No dramatic increase in pain with lumbar sidebending or lumbar trunk rotation.  No significant increase in pain with bilateral lumbar facet loading (simultaneous.  MUSCLE STRENGTH:  The patient has 5/5 strength for the bilateral hip flexors, knee flexors/extensors, ankle dorsiflexors/plantar flexors, great toe extensors, ankle evertors/invertors.  NEUROLOGICAL: 2/4 right patellar reflex but absent left patellar reflex.  Trace Achilles and medial hamstring reflexes bilaterally.  Downgoing Babinski's bilaterally.  No ankle clonus bilaterally. Sensation to light touch is intact in mildly impaired in the left S1 dermatome compared to the " right S1 dermatome.  Sensation to light touch appears to be intact in the bilateral L4 and L5 dermatomes.  VASCULAR:  2/4 dorsalis pedis pulses bilaterally.  Bilateral lower extremities are warm.  There is no pitting edema of the bilateral lower extremities.  ABDOMINAL:  Soft, non-distended, non-tender throughout all quadrants.  No pulsatile mass palpated in the left lower quadrant.  LYMPH NODES:  No palpable or tender inguinal/popliteal lymph nodes.  MUSCULOSKELETAL: Negative straight leg raising test bilaterally.  The patient has mildly restricted range of motion of the hips, when tested in a flexed position testing internal/external rotation.  Fabere's test is negative bilaterally.  Gaenslen's test is negative bilaterally.

## 2021-06-23 NOTE — TELEPHONE ENCOUNTER
----- Message from Lauryn Barber CMA sent at 12/10/2018  2:38 PM CST -----  Regardin week visit follow up  Call to patient needed for 4 week follow up. Reference 12/10/18 visit with Dr. Gold.    Thanks!  Lauryn

## 2021-06-23 NOTE — TELEPHONE ENCOUNTER
Patient status reviewed with Aurea Campos CNP States can proceed with the TFESI. Phone call to pt to discuss. Order placed in Epic. Injection requirements reviewed with patient. Stated understanding. He will try to find a  for this afternoon. He is instructed to call and cancel appointment if unable to get a .

## 2021-06-23 NOTE — PROGRESS NOTES
Assessment:     Diagnoses and all orders for this visit:    Chronic bilateral low back pain with right-sided sciatica  -     OPS Medial Branch Block Bilateral    Lumbar facet arthropathy  -     OPS Medial Branch Block Bilateral    Annular tear of lumbar disc    Lumbar foraminal stenosis  -     OPS Medial Branch Block Bilateral    Bulge of lumbar disc without myelopathy  -     OPS Medial Branch Block Bilateral       Ruperto Gomez is a 68 y.o. y.o. male patient of Dr. Gold's last seen 12/10/2018 with past medical history significant for hypercholesterolemia, BPH, anal/rectal fissure, osteoarthritis, polio (with left residual leg weakness/atrophy), tremors, restless leg syndrome who presents today for follow-up regarding:    -Ongoing chronic bilateral low back pain that is been progressive in the last couple of months and even more so in the last couple weeks.  Back pain is worse with lumbar facet loading indicating facet mediated pain.    -More mild burning sensation into the right lateral thigh likely related to L4-5 disc bulge on the right with lateral recess stenosis, minimal foraminal stenosis.    Patient is neurologically intact on exam otherwise.     Plan:     A shared decision making plan was used. The patient's values and choices were respected. Prior medical records from 12/10/2018 to current were reviewed today. The following represents what was discussed and decided upon by the provider and the patient.        -DIAGNOSTIC TESTS: Images were personally reviewed and interpreted.   --[facility] MRI 12/8/2018 shows L5-S1 annular tear with disc bulge/herniation with right S1 nerve root compression and moderate up down foraminal stenosis on the left with left L5 nerve root compression.  L4-5 right disc bulge with compression on bilateral L5 nerve roots with mild right foraminal stenosis.  Lumbar facet arthropathy most significant at L3-4, L4-5, L5-S1.    -INTERVENTIONS: Ordered bilateral L2, L3, L4 medial branch  block to determine how much of his low back pain is facet mediated as he does have facet arthropathy at L4-5 and L5-S1 and some increased pain with facet loading on exam.  We did discuss that if he gets benefit with this injection I would recommend a bilateral L4-5 and L5-S1 facet joint steroid injection.  If he does not get relief however I would recommend a right L4-5 transfemoral epidural steroid injection.  Patient is in agreement with this plan.    -MEDICATIONS: No change in medications today, could consider gabapentin down the road, patient did not continue Celebrex however did discuss that this was an option to continue if he wanted for pain and inflammation as well.  Advised patient to not take Celebrex with ibuprofen, Advil, over-the-counter naproxen or Aleve.  Discussed side effects of medications and proper use. Patient verbalized understanding.    -PHYSICAL THERAPY: Did highly recommend physical therapy we discussed seeing [facility] to establish home exercise program which would likely be only 3-5 sessions to establish this.  Another option would be lumbar MedX program that he could either do here at the spine center versus physician second back and Shan which he is interested in but does not feel he has time to do it at this time.  Discussed if he wanted either of these options referral again I can place this however he did have a referral for traditional therapy previously from Dr. Gold which we did print off and give him today.  Discussed the importance of core strengthening, ROM, stretching exercises with the patient and how each of these entities is important in decreasing pain.  Explained to the patient that the purpose of physical therapy is to teach the patient a home exercise program.  These exercises need to be performed every day in order to decrease pain and prevent future occurrences of pain.        -PATIENT EDUCATION:  25 minutes of total visit time was spent face to  face with the patient today, 60 % of the visit was spent on counseling, education, and coordinating care.   -5 minutes spent outside of visit time, non-face-to-face time, reviewing chart.    -FOLLOW UP: Follow-up for medial branch block injection.  Advised to contact clinic if symptoms worsen or change.    Subjective:     Ruperto Gomez is a 68 y.o. male who presents today for follow-up regarding chronic bilateral low back pain at the lumbosacral junction that is the most bothersome for him ongoing for years but progressive in the last couple of months and even more severe and debilitating in the last week in which he currently reports his pain at 8/10 and does not feel he can tolerate it anymore.  He does have some more minor burning type sensation into the right anterior thigh that stops at the knee, no pain below the knees however he does have chronic numbness and tingling in his bilateral feet that is been ongoing for years.  Patient denies any recent trips or falls or balance changes, denies weakness in his bilateral lower extremities, denies bowel or bladder dysfunction.  Patient is here to further discuss his MRI findings and options at this point as he feels he was not very clear on his previous appointment with Dr. Gold.    *Dr. Gold previously recommended on 12/10/2018 starting with anti-inflammatory medications and offered patient Celebrex, she also recommended physical therapy in order was placed for [facility].  She stated that if this was not beneficial she would recommend  trialing bilateral L3, L4, L5 medial branch block to determine how much of his pain is coming from the lumbar facets.  If back pain became more problematic she recommended trialing a right L4-5 TF LISA.    Treatment to Date: No prior spinal surgery.  *Previous physical therapy [facility] placed 12/10/2018 by Dr. Gold.    2012 Previous L4-5 interlaminar LISA    Medications:  Celebrex patient trialed once but  "did not continue, not interested in further medications today he states.    Current Outpatient Medications on File Prior to Encounter   Medication Sig Dispense Refill     cyclobenzaprine HCl (CYCLOBENZAPRINE ORAL) Take by mouth.       docosahexanoic acid/epa (FISH OIL ORAL) Take by mouth.       OMEPRAZOLE ORAL Take by mouth.       propranolol HCl (PROPRANOLOL ORAL) Take by mouth.       ropinirole HCl (ROPINIROLE ORAL) Take by mouth.       SAW PALMETTO ORAL Take by mouth.       celecoxib (CELEBREX) 100 MG capsule Take 1 capsule twice a day as needed for pain.. 90 capsule 0     No current facility-administered medications on file prior to encounter.        Allergies   Allergen Reactions     Atorvastatin      Fentanyl      Simvastatin        No past medical history on file.     Review of Systems  ROS: Positive for balance changes.  Specifically negative for bowel/bladder dysfunction, balance changes, headache, dizziness, foot drop, fevers, chills, appetite changes, nausea/vomiting, unexplained weight loss. Otherwise 13 systems reviewed are negative. Please see the patient's intake questionnaire from today for details.    Reviewed Social, Family, Past Medical and Past Surgical history with patient, no significant changes noted since prior visit.     Objective:     /72   Pulse 66   Temp 98.1  F (36.7  C) (Oral)   Resp 16   Ht 5' 8\" (1.727 m)   Wt 179 lb 8 oz (81.4 kg)   SpO2 98%   BMI 27.29 kg/m      PHYSICAL EXAMINATION:    --CONSTITUTIONAL: Well developed, well nourished, healthy appearing individual.  --PSYCHIATRIC: Appropriate mood and affect. No difficulty interacting due to temper, social withdrawal, or memory issues.  --SKIN: Lumbar region is dry and intact.   --RESPIRATORY: Normal rhythm and effort. No abnormal accessory muscle breathing patterns noted.   --MUSCULOSKELETAL:  Normal lumbar lordosis noted, no lateral shift.  --GROSS MOTOR: Easily arises from a seated position. Gait is " non-antalgic  --LUMBAR SPINE:  Inspection reveals no evidence of deformity. Range of motion is not limited in lumbar flexion, increased pain with lumbar extension and lateral rotation simultaneously today. No tenderness to palpation lumbar spine. Straight leg raising in the supine position is negative to radicular pain. Sciatic notch non-tender.   --SACROILIAC JOINT: One Finger point test negative.  --LOWER EXTREMITY MOTOR TESTING:  Plantar flexion left 5/5, right 5/5   Dorsiflexion left 5/5, right 5/5   Great toe MTP extension left 5/5, right 5/5  Knee flexion left 5/5, right 5/5  Knee extension left 5/5, right 5/5   Hip flexion left 5/5, right 5/5  Hip abduction left 5/5, right 5/5  Hip adduction left 5/5, right 5/5   --HIPS: Full range of motion bilaterally.   --NEUROLOGIC: Bilateral patellar and achilles reflexes are physiologic and symmetric. Sensation to light touch is intact in the bilateral L4, L5, and S1 dermatomes.    RESULTS:   Imaging: Lumbar spine imaging was reviewed today. The images were shown to the patient and the findings were explained using a spine model.      MRI OF THE LUMBAR SPINE WITHOUT CONTRAST  [facility]-12/3/2018  CLINICAL INFORMATION: Predominantly right-sided low back pain and sciatica. No previous lumbar spine imaging procedures.  TECHNICAL INFORMATION: T1-weighted intermediate, proton density T2-weighted sagittal fast spin echo and intermediate, and T1 and T2-weighted fast spin echo axial images plus STIR sequence were obtained of the lumbar spine. A T2-weighted coronal series was obtained.  SEDATION: None.  INTERPRETATION: Images reveal disc degeneration in the lower thoracic and lumbar spine, with nuclear dehydration evident at T12-L1 and at L2-3 through L5-S1. Spinal cord terminates dorsal to the T12-L1 disc level and appears intrinsically normal. There are changes of Scheuermann's disease in the lower thoracic and lumbar region, with Schmorl's nodes present as low as L4-5. Coronal  series reveals normal-appearing kidneys.  At L5-S1, there is dehydration and vertical narrowing of the disc with a posterior and right-sided annular tear and 4 mm disc herniation, displacing the right S1 nerve root centrally (series 5, images 3 and 4 and series 2, images 5-10). Moderate up-down foraminal stenosis is present laterally on the left with lateral and far lateral left L5 ganglionic impingement (series 2, images 13-15). There is type I marrow edema in the L5 and S1 vertebral bodies laterally on the left side (series 3 and 4, images 9-15). Facet joint effusion is noted on the left.  At L4-5, a broadly based bulging disc annulus is present, slightly eccentric to the right. Localized caudal dissection of the disc is present centrally and to the right (series 2, images 10 and 11). The disc indents the thecal sac and impinges upon both L5 nerve roots centrally. Mild foraminal narrowing is present laterally on the right. Left neural foramen is mildly narrowed in AP dimension. Moderate facet hypertrophy and degeneration is noted on the right. Bilateral facet joint effusions are present at L4-5.  At L3-4, there are prominent bilateral facet joint effusions. A lateral and far lateral annular tear and disc herniation is present on the right, involving the superior aspect of the disc annulus (series 5, image 16 and series 2, images 3-6).  At L2-3, a broadly based posterior high intensity annular tear is present with a 3 mm disc protrusion across the full width of the spinal canal. Thecal sac is indented. No lateralizing nerve root displacement. Neural foramina are widely patent and normal in caliber. Mild facet joint degeneration is present on the left side.  The L1-2, T12-L1 and T11-12 discs exhibit normal posterior contour. Schmorl's nodes are present.  No evidence of either anomalous neural development or arachnoid disease.  The sacrum and sacroiliac joints appear normal.  The aorta and iliofemoral arteries appear  normal in caliber. Iliopsoas and dorsal spinal muscles appear normal and symmetrical.  CONCLUSION: Multiple level degenerative lumbar spondylosis with changes of old underlying Scheuermann's disease and the following major findings:  1. Posterior and right-sided L5-S1 disc herniation with right S1 neural impingement proximally. Foraminal stenosis laterally on the left with left L5 ganglionic impingement.  2. L4-5 disc degeneration with broadly based dorsal tear and disc protrusion and localized caudal dissection.  3. Prominent facet joint effusions, L3-4 with lateral and far lateral disc herniation on the right side.  4. High intensity tear and broadly based dorsal tear and disc protrusion, L2-3 without neural impingement.

## 2021-06-23 NOTE — PATIENT INSTRUCTIONS - HE
*Return Pain Diary as soon as possible. We will review & get back to you with future plan of care.      DISCHARGE INSTRUCTIONS    During office hours (8:00 a.m.- 4:30 p.m.) questions or concerns may be answered  by calling  [phone] or spine navigation [phone]. If you experience any problems after hours  please call [phone] and you will be connected to [facility] Connection     All Patients:  ? You may experience an increase in your symptoms for the first 2 days, once the numbing medication wears off.    ? You may resume your regular medication, no pain medication until you have completed your diary    ? You may shower. No swimming, tub bath or hot tub for 24 hours; remove bandage after 4 hours    ? Continue your activities that can cause you pain to test the blocks.                         ? You should not drive for the next 3 to 5 hours (have someone drive you)           POSSIBLE PROCEDURE SIDE EFFECTS  -Call Spine Center if concerned-    Increased Pain  Increased numbness/tingling     Nausea/Vomiting  Bruising/bleeding at site (hematoma)             Swelling at site (edema) Headache  Difficulty walking  Infection        Fever greater than 100.5

## 2021-06-23 NOTE — PATIENT INSTRUCTIONS - HE
Please follow up two weeks post procedure with Aurea to evaluate plan of care.       DISCHARGE INSTRUCTIONS    During office hours (8:00 a.m.- 4:30 p.m.) questions or concerns may be answered  by calling Spine Navigation Nurses at  [phone].     If you experience any problems after hours  please call [phone] and you will be connected to [facility] Connection.     All Patients:    ? You may experience an increase in your symptoms for the first 2 days (It may take anywhere between 2 days- 2 weeks for the steroid to have maximum effect).    ? You may use ice on the injection site, as frequently as 20 minutes each hour if needed.    ? You may take your pain medicine.    ? You may continue taking your regular medication after your injection. If you have had a Medial Branch Block you may resume pain medication once your pain diary is completed.    ? You may shower. No swimming, tub bath or hot tub for 48 hours.  You may remove your bandaid/bandage as soon as you are home.    ? You may resume light activities, as tolerated.    ? Resume your usual diet as tolerated.    ? It is strongly advised that you do not drive for 1-3 hours post injection.    ? If you have had oral sedation:  Do not drive for 8 hours post injection.      ? If you have had IV sedation:  Do not drive for 24 hours post injection.  Do not operate hazardous machinery or make important personal/business decisions for 24 hours.      POSSIBLE STEROID SIDE EFFECTS (If steroid/cortisone was used for your procedure)    -If you experience these symptoms, it should only last for a short period      Swelling of the legs                Skin redness (flushing)       Mouth (oral) irritation     Blood sugar (glucose) levels              Sweats                      Mood changes    Headache    Sleeplessness         POSSIBLE PROCEDURE SIDE EFFECTS  -Call the Spine Center if you are concerned    Increased Pain             Increased numbness/tingling         Nausea/Vomiting            Bruising/bleeding at site        Redness or swelling                                                Difficulty walking        Weakness             Fever greater than 100.5    *In the event of a severe headache after an epidural steroid injection that is relieved by lying down, please call the [facility] to speak with a clinical staff member*

## 2021-06-23 NOTE — PATIENT INSTRUCTIONS - HE
If you want to trial physical therapy to establish home exercises you can call and schedule an appointment with [facility] rehab as Dr. Gold did place an order back in December or if you need I can place a new order.    We also discussed physical therapy with physicians neck and back and [place] for the MedX machines for more extensive core strengthening, if you decide you want to do this in the future we can also schedule that and place a new order.    Discussed the importance of core strengthening, ROM, stretching exercises with the patient and how each of these entities is important in decreasing pain.  Explained to the patient that the purpose of physical therapy is to teach the patient a home exercise program.  These exercises need to be performed every day in order to decrease pain and prevent future occurrences of pain.        ~Please call Nurse Navigation line [phone] with any questions or concerns about your treatment plan, if symptoms worsen and you would like to be seen urgently, or if you have problems controlling bladder and bowel function.  ~Follow Up Appointment time slots with Aurea Campos, CNP with the Spine Center, are also available at the [facility] location near [facility] on the first and third THURSDAY afternoons of each month.

## 2021-06-23 NOTE — TELEPHONE ENCOUNTER
"Patient calling to find out if he can return for the discussed L4-L5 epidural steroid injection. \"I have 2 different issues in my spine. This right hip and front of thigh pain is driving me crazy. I can't sleep. It worsens when I get up to do anything.\" Rates pain 8/10 at worst and 2/10 at best.     Reports he has started to see pain easing up in back since the facet injections of 2 weeks ago. Explained would update provider and get her recommendations. Stated understanding.    "

## 2021-06-23 NOTE — PATIENT INSTRUCTIONS - HE
Follow-up visit with Aurea Campos in 2 weeks to discuss injection outcome and determine care plan going forward.       DISCHARGE INSTRUCTIONS    During office hours (8:00 a.m.- 4:30 p.m.) questions or concerns may be answered  by calling Spine Navigation Nurses at  [phone].     If you experience any problems after hours  please call [phone] and you will be connected to [facility] Connection.     All Patients:    ? You may experience an increase in your symptoms for the first 2 days (It may take anywhere between 2 days- 2 weeks for the steroid to have maximum effect).    ? You may use ice on the injection site, as frequently as 20 minutes each hour if needed.    ? You may take your pain medicine.    ? You may continue taking your regular medication after your injection. If you have had a Medial Branch Block you may resume pain medication once your pain diary is completed.    ? You may shower. No swimming, tub bath or hot tub for 48 hours.  You may remove your bandaid/bandage as soon as you are home.    ? You may resume light activities, as tolerated.    ? Resume your usual diet as tolerated.    ? It is strongly advised that you do not drive for 1-3 hours post injection.    ? If you have had oral sedation:  Do not drive for 8 hours post injection.      ? If you have had IV sedation:  Do not drive for 24 hours post injection.  Do not operate hazardous machinery or make important personal/business decisions for 24 hours.      POSSIBLE STEROID SIDE EFFECTS (If steroid/cortisone was used for your procedure)    -If you experience these symptoms, it should only last for a short period      Swelling of the legs                Skin redness (flushing)       Mouth (oral) irritation     Blood sugar (glucose) levels              Sweats                      Mood changes    Headache    Sleeplessness         POSSIBLE PROCEDURE SIDE EFFECTS  -Call the Spine Center if you are concerned    Increased Pain              Increased numbness/tingling        Nausea/Vomiting            Bruising/bleeding at site        Redness or swelling                                                Difficulty walking        Weakness             Fever greater than 100.5    *In the event of a severe headache after an epidural steroid injection that is relieved by lying down, please call the [facility] to speak with a clinical staff member*

## 2021-06-26 NOTE — TELEPHONE ENCOUNTER
Medicare requires a medial branch block injection to be done before definitive treatment can be done.  I would recommend a right C4, C5 medial branch block to target the inflammatory arthritis at the C4-5 facet joint.  He will need two sets of blocks to be able to proceed with a facet joint injection.  Due to the inflammatory arthritis, he is not deemed to be a candidate for a radiofrequency ablation procedure (this procedure is contraindicated without trying a more conservative treatment first).

## 2021-06-26 NOTE — TELEPHONE ENCOUNTER
Call placed to pt with providers response. Left detailed message that test injections would need to be completed prior to facet joint injection. Asked that he please call the nurse navigation line back when he is available to discuss this.

## 2021-06-26 NOTE — TELEPHONE ENCOUNTER
PSP:  Dr. Gold  Last clinic visit:  5/24/2021 OV  Reason for call: Follow-up  Clinical information:  Call placed to pt to check on status after last OV with Dr. Gold. Pt reports he is the same and his pain is still there. He states he hasn't had the time with his schedule or the money to start physical therapy.   Pt reports he is interested in injections if possible. He states they were very helpful for his low back and so he would like to try one for his right-sided neck pain. He states he did not have to go to physical therapy for this injection.   Advice given to patient: Informed pt that provider would be updated. Detailed message ok upon call back.   Provider to address: Please advise.

## 2021-07-06 ENCOUNTER — HOSPITAL ENCOUNTER (OUTPATIENT)
Dept: PHYSICAL MEDICINE AND REHAB | Facility: CLINIC | Age: 71
Discharge: HOME | End: 2021-07-06
Attending: PHYSICAL MEDICINE & REHABILITATION
Payer: COMMERCIAL

## 2021-07-06 VITALS — BODY MASS INDEX: 26.37 KG/M2 | HEIGHT: 68 IN | WEIGHT: 174 LBS

## 2021-07-06 DIAGNOSIS — M54.2 NECK PAIN: ICD-10-CM

## 2021-07-06 DIAGNOSIS — M54.41 CHRONIC BILATERAL LOW BACK PAIN WITH RIGHT-SIDED SCIATICA: ICD-10-CM

## 2021-07-06 DIAGNOSIS — G89.29 CHRONIC BILATERAL LOW BACK PAIN WITH RIGHT-SIDED SCIATICA: ICD-10-CM

## 2021-07-06 DIAGNOSIS — M54.16 LUMBAR RADICULITIS: ICD-10-CM

## 2021-07-06 DIAGNOSIS — M47.812 ARTHROPATHY OF CERVICAL FACET JOINT: ICD-10-CM

## 2021-07-06 NOTE — PROGRESS NOTES
Progress Notes by Lashawn Lange DO at 7/6/2021 10:20 AM     Author: Lashawn Lange DO Service: -- Author Type: Physician    Filed: 7/6/2021  4:23 PM Date of Service: 7/6/2021 10:20 AM Status: Signed    : Lashawn Lange DO (Physician)       Assessment:   Ruperto Gomez (AKINDIA Kirby) is a 71 y.o. y.o. male with past medical history significant for hypercholesterolemia, benign prostatic hypertrophy, inguinal rectal fissure, osteoarthritis, polio (with left residual leg weakness/atrophy), tremors, restless leg syndrome who presents today for follow-up regarding acute and severe right-sided low back pain that started 7 days ago after lifting a heavy tire.  His pain has been severe to the point that he has not been able to work in the last 5 days and he is financially strained due to this and desperate to go back to work.  Pain is secondary to lateral recess stenosis seen at the L4-5 level.  He is neurologically intact and without red flag symptoms.      The patient was not wearing a mask today.  I did explain that all healthcare facilities are required to still wear masks.  He is told to wear a mask to any healthcare appointment at this time.    PSP:  Dr. Gold       Plan:     A shared decision making plan was used.  The patient's values and choices were respected.  The following represents what was discussed and decided upon by the physician and the patient.      1.  DIAGNOSTIC TESTS:    -The patient's MRI of the cervical soft tissues performed at [facility] in May 2021 was personally reviewed today by this physician with the physician performing her own interpretation.  There is right C4-5 facet arthropathy with an inflammatory component.  There are degenerative changes with inflammation seen of the endplates of C4 and C5.  No significant central canal stenosis.  -The patient's MRI of the lumbar spine performed at [facility] from December 3 of 2018 was personally reviewed today by the  physician with the physician performing her own interpretation.  Patient has facet arthropathy seen at the L3-4, L4-5, 5 S1 levels.  There is bilateral L5-S1 foraminal stenosis with lateral recess stenosis.  There is an L5-S1 annular disc tear.  There is a disc bulge at the L4-5 level which causes bilateral L4-5 lateral recess stenosis.  2.  PHYSICAL THERAPY: At the last visit, the patient was recommended to go to physical therapy for the neck pain.  He declined, stating that he does not have the money to go to physical therapy.  I believe he has done physical therapy for the low back previously.  He is encouraged to continue doing his home exercise program on a regular basis.    3.  MEDICATIONS:    -At the last visit Celebrex 100 mg twice a day as needed for pain was prescribed.  He states that he tried this and it was ineffective so he discontinued it.    -He can alternate between Aleve and ibuprofen with the Celebrex, but he should not take them simultaneously.  -The patient previously discontinued cyclobenzaprine, stating he had side effects to it.  -Patient previously declined to  the prescription for gabapentin, stating he was afraid of the side effects. He continues to decline this medication.   4.  INTERVENTIONS: He would like a repeat right L4-5 transforaminal epidural steroid injection.  I did ask for urgent PA. The PA team did communicate back that PA is not required by his insurance and he can move forward with the injection.  Please see the separate procedure note for the injection documentation.  -He has done well with previous bilateral L4-5 transforaminal epidural steroid injections on March 10 of 2021.  -He had previously had relief with bilateral L4-5 and L5-S1 facet joint injections on February 4 of 2021.  -Patient was previously offered a right C4 and C5 medial branch block.  He was called by nurse navigation who was unable to reach him, and did not return the phone call.  At this time he  would like to move forward with this.  He can schedule this for 1 week after the lumbar TFESI.  I did explain that this is a requirement by his insurance company.  His insurance will not authorize a facet joint injection without trialing the medial branch block first.  He has to undergo 2 sets of medial branch blocks before he can have a cortisone injection.  Again he is contraindicated for a radiofrequency ablation given the severe inflammation seen at this level.  - He had a dose of the COVID vaccine on 3-25-21.  He states he had both doses.    5.  PATIENT EDUCATION:    -I did explain that his insurance would not cover 2 injections (1 in the cervical spine one in the low back) on the same day.  He will need to schedule these separately.  He does feel that the low back injection is more urgent at this time, we can proceed with this today.  -All of his questions were answered to his satisfaction today.  He was in agreement the treatment plan.  6.  FOLLOW-UP: He can follow-up this afternoon for the injection in the next week for the left C4 and C5 medial branch block. If there are any questions/concerns or any significant worsening of pain prior to that time, the patient is asked to call the clinic via the nurse navigation line or via [employer].     Subjective:     Ruperto Gomez (AKA Kofi) is a 71 y.o. male who presents today for follow-up regarding acute right-sided low back pain with right radicular/sciatica type leg symptoms.  Reports that this feels very similar to his past pain, but it is just on the right side.  He denies any symptoms on the left side.  Pain started about a week ago.  Reports that he was on vacation when he lost a tire on his car.  Reports that he had to look through the words to find it.  He did find up and then he had to carry at several feet on uneven ground and this seemed to really aggravate the pain.  Reports that the pain has not relented since it first started.  He reports that the pain  is very severe and he has not been able to work in 5 days.  He is desperate for some relief so that he can return to work as this is very financially difficult for him with not being able to work.  He is rating his pain today at a 10 out of 10.  At worst it is a 10 out of 10.  At best it is an 8 out of 10.  His pain is located in the right lateral thigh and then it goes down the anterolateral thigh stopping at the knee.  He denies any numbness tingling or weakness.  His pain is worse with walking.  Reports that really nothing has relieved it at this time.  He does state that he tried the Celebrex but it really did not provide any relief.  He is gone back to taking ibuprofen 600 mg once a day.  He did not start the gabapentin because he was afraid of side effects.  He is not taking any other pain medications.  He would like to repeat the injection as soon as possible so that he can try to return to work.    He continues to have some right-sided neck pain.  Reports it is still fairly severe, but is not as bad as the low back.  If he has to take 1 injection to do first, he definitely wants to proceed with a low back injection as opposed to the neck injection.  He is rating the neck pain at about a 5 or 6 out of 10.  Is just on the right side of his neck.  It does not radiate down the arm.  He denies any numbness tingling or weakness in the arm.    Past medical history is reviewed and is unchanged for any new medical diagnoses in the interim.      Family history is reviewed and is unchanged in the interim.      Review of Systems:  Positive for occasional headaches and pain that makes it difficult to sleep at night..  Pertinent negatives include no numbness, tingling, weakness, fevers, chills, unexplained weight loss, bowel incontinence, bladder incontinence, trips, stumbles, falls.  All others reviewed and are negative.     Objective:   CONSTITUTIONAL:  Vital signs as above.  No acute distress.  The patient is well  nourished and well groomed.    PSYCHIATRIC:  The patient is awake, alert, oriented to person, place and time.  The patient is answering questions appropriately with clear speech.  Normal affect.  SKIN:  Skin over the face, posterior torso, bilateral upper and lower extremities is clean, dry, intact without rashes.  MUSCULOSKELETAL:  Gait is non-antalgic.  The patient is able to heel and toe walk without any difficulty.  Mild tenderness over the right lower lumbar paraspinal muscles.      The patient has 5/5 strength for the bilateral hip flexors, knee flexors/extensors, ankle dorsiflexors/plantar flexors, ankle evertors/invertors.    NEUROLOGICAL:2/4 patellar, medial hamstring, achilles reflexes which are symmetric bilaterally.  No ankle clonus bilaterally.  Sensation to light touch is intact in the bilateral L4, L5, and S1 dermatomes.

## 2021-07-06 NOTE — PATIENT INSTRUCTIONS - HE
Patient Instructions by Lashawn Lange DO at 7/6/2021 10:20 AM     Author: Lashawn Lange DO Service: -- Author Type: Physician    Filed: 7/6/2021 11:05 AM Date of Service: 7/6/2021 10:20 AM Status: Signed    : Lashawn Lange DO (Physician)       A right L4-5 TFESI  been ordered today.  Please plan to arrive at 3:00 today.  I will call you if something happens with the insurance authorization but otherwise plan to be here.  On the day of your injection, you cannot be sick or taking antibiotics.  If you become sick and are prescribed, please call the clinic so your injection can be rescheduled for once you have completed your antibiotics.  You will need to bring a  with you for your injection.   If you have any questions or concerns prior to your injection, please do not hesitate to call the nurse navigation line at [phone] or contact Dr. Gold through [facility].

## 2021-07-06 NOTE — PATIENT INSTRUCTIONS - HE
Patient Instructions by Breyer, Nathan J, RN at 7/6/2021  3:00 PM     Author: Breyer, Nathan J, RN Service: -- Author Type: Registered Nurse    Filed: 7/6/2021  3:52 PM Date of Service: 7/6/2021  3:00 PM Status: Addendum    : Breyer, Nathan J, RN (Registered Nurse)    Related Notes: Original Note by Breyer, Nathan J, RN (Registered Nurse) filed at 7/6/2021  2:52 PM       Please schedule the cervical medial branch block injection with Dr. Gold in 1 week.       DISCHARGE INSTRUCTIONS    During office hours (8:00 a.m.- 4:00 p.m.) questions or concerns may be answered  by calling [facility] Navigation Nurses at  [phone].  Messages received after hours will be returned the following business day.      In the case of an emergency, please dial 911 or seek assistance at the nearest Emergency Room/Urgent Care facility.     All Patients:    ? You may experience an increase in your symptoms for the first 2 days (It may take anywhere between 2 days- 2 weeks for the steroid to have maximum effect).    ? You may use ice on the injection site, as frequently as 20 minutes each hour if needed.    ? You may take your pain medicine.    ? You may continue taking your regular medication after your injection. If you have had a Medial Branch Block you may resume pain medication once your pain diary is completed.    ? You may shower. No swimming, tub bath or hot tub for 48 hours.  You may remove your bandaid/bandage as soon as you are home.    ? You may resume light activities, as tolerated.    ? Resume your usual diet as tolerated.    ? It is strongly advised that you do not drive for 1-3 hours post injection.    ? If you have had oral sedation:  Do not drive for 8 hours post injection.      ? If you have had IV sedation:  Do not drive for 24 hours post injection.  Do not operate hazardous machinery or make important personal/business decisions for 24 hours.      POSSIBLE STEROID SIDE EFFECTS (If steroid/cortisone was used  for your procedure)    -If you experience these symptoms, it should only last for a short period      Swelling of the legs                Skin redness (flushing)       Mouth (oral) irritation     Blood sugar (glucose) levels              Sweats                      Mood changes    Headache    Sleeplessness    Weakened immune system for up to 14 days, which could increase the risk of reynaldo the COVID-19 virus and/or experiencing more severe symptoms of the disease, if exposed.    Decreased effectiveness of the flu vaccine if given within 2 weeks of the steroid.         POSSIBLE PROCEDURE SIDE EFFECTS  -Call the Spine Center if you are concerned    Increased Pain             Increased numbness/tingling        Nausea/Vomiting            Bruising/bleeding at site        Redness or swelling                                                Difficulty walking        Weakness             Fever greater than 100.5    *In the event of a severe headache after an epidural steroid injection that is relieved by lying down, please call the [facility] to speak with a clinical staff member*

## 2021-07-10 VITALS — WEIGHT: 170 LBS | BODY MASS INDEX: 25.85 KG/M2

## 2022-07-05 NOTE — PROGRESS NOTES
Assessment:   Ruperto Gomez (AKA Kofi) is a 72 year old y.o. male with past medical history significant for hypercholesterolemia, benign prostatic hypertrophy, inguinal rectal fissure, osteoarthritis, polio (with left residual leg weakness/atrophy), tremors, restless leg syndrome who presents today for follow-up regarding acute flare up of bilateral low back pain without radicular/sciatica leg symptoms.  This started about 10 days ago after lifting something that weighed 70 lbs.  Pain is severe and significantly impacting his functioning/quality of life.   This is consistent with reaggravation of his lumbar facet joint syndrome secondary to lumbar facet arthropathy.  He has had 100% relief of his pain with previous bilateral L4-5 and L5-S1 facet joint injections, last performed in 2019.    PSP:  Dr. Gold       Plan:     A shared decision making plan was used.  The patient's values and choices were respected.  The following represents what was discussed and decided upon by the physician and the patient.      1.  DIAGNOSTIC TESTS:    -Patient's MRI of the lumbar spine performed at [facility] on December 3, 2018 was personally reviewed today by the physician with physician performing her own interpretation.  There is facet arthropathy seen at the L3-4, L4-5, L5-S1 levels.  There is bilateral L5-S1 foraminal stenosis with lateral recess stenosis.  There is an L5-S1 annular disc tear.  There is a disc bulge at the L4-5 level which does cause bilateral L4-5 lateral recess stenosis.  There is no need for updated imaging at this time given that he does not have radicular symptoms or red flag symptoms but if his pain does not improved as expected, an MRI can be ordered.  2.  PHYSICAL THERAPY:   -The patient had previously declined to go to physical therapy stating it was financially prohibitive for him.  He has done physical therapy in the past and is encouraged to continue doing his home exercise program on a regular basis.  3.   MEDICATIONS:  No changes to his medications at this time.   -He can continue to alternate between the Aleve and ibuprofen.  -He had previously been prescribed gabapentin, but he stated he was too afraid to take it secondary to potential side effects  -He has tried and failed Celebrex secondary to ineffectiveness.  -He had previously tried and failed cyclobenzaprine secondary to side effects.  4.  INTERVENTIONS:    - I have requested urgent PA for repeat bilateral L4-5 and L5-S1 facet joint injections.  I do believe that RFA is relatively contraindicated given that he had such good relief with a less invasive procedure for almost 18 months.  The PA team did let me know (I spoke personally to a PA team member) that he is required to undergo MBBs before he can have repeat facet joint injections.  The nurse navigation team was asked to contact him to let him know about the MBB requirement and that injection tomorrow has to be changed to MBB.    -He is status post a right L4-5 transforaminal epidural steroid injection on July 6, 2021.  He reports that this gave him 100% relief of the right radicular leg pain and continues to give relief.  -He had previously been offered a right C4 and C5 medial branch block, but did not follow through with this.  The neck pain seems to be manageable at this time  -He reports greater than 75% relief with previous bilateral L4-5 transforaminal epidural steroid injections on March 10, 2021.  -He had previously had relief with bilateral L4-5 and L5/facet joint injections on February 4, 2021.  He reports that he had 100% relief of his pain following these injections for 17 months.    5.  PATIENT EDUCATION:    -All of his questions were answered to his satisfaction today.  He was in agreement with the treatment plan  6.  FOLLOW-UP: Patient will follow up tomorrow for the bilateral L4-5 and L5-S1 facet joint injections and I would like to see him back 2 weeks after that to ensure that he is  getting relief.  If there are any questions/concerns or any significant worsening of pain prior to that time, the patient is asked to call the clinic via the nurse navigation line or via [facility].     A total of 35 minutes was spent in the care of this patient on the date of service.    Subjective:     Ruperto Gomez (AKA Kofi)  is a 72 year old male who presents today for follow-up regarding acute flareup of bilateral low back pain..  Since the patient was last seen on July 6, 2021, he reports that he was having very good relief of his back and leg symptoms.  He reports that he lifted something that weighed about 70 lbs about 10 days ago and now he has the aggravation of his pain.  The pain continues to go across the low back with pain equal on both sides.  He does not have any radiation of pain going down the legs.  (No weakness in the legs.  He rates his pain today at a 6 out of 10.  At worst it is a 9/10.  Best is a 4 out of 10.  His pain remains severe at this point to the point that his functioning is affected and he cannot work.  He is wondering if he can have a repeat injection so he can return to work.  He says that he had 100% relief with the previous bilateral L4-5 and L5-S1 facet joint injections performed on 2-4-21 that lasted until 10 days ago when he lifted too heavy.  He is taking ibuprofen, and cyclobenzaprine as needed but these are not helping with pain.        Past medical history is reviewed and is unchanged for any new medical diagnoses in the interim.      Family history is reviewed and is unchanged in the interim.      Review of Systems:  Positive for headaches.  Pertinent negatives include no numbness, tingling, weakness, fevers, chills, unexplained weight loss, bowel incontinence, bladder incontinence, trips, stumbles, falls.  All others reviewed and are negative.     Objective:   CONSTITUTIONAL:  Vital signs as above.  No acute distress.  The patient is well nourished and well groomed.     PSYCHIATRIC:  The patient is awake, alert, oriented to person, place and time.  The patient is answering questions appropriately with clear speech.  Normal affect.  SKIN:  Skin over the face, posterior torso, bilateral upper and lower extremities is clean, dry, intact without rashes.  MUSCULOSKELETAL:  Gait is non-antalgic.  The patient is able to heel and toe walk without any difficulty.  No significant tenderness over the bilateral lumbar paraspinal muscles.   He denies any pain with lumbar flexion.  He does have some pain chain with lumbar extension.  He has a pulling sensation with bilateral lumbar sidebending.  He also reports a stretching sensation with bilateral trunk rotation.  He does report mild reproduction of pain with bilateral lumbar facet loading (simultaneous extension rotation).   The patient has 5/5 strength for the bilateral hip flexors, knee flexors/extensors, ankle dorsiflexors/plantar flexors, ankle evertors/invertors.  Negative Fabere's test bilaterally.  NEUROLOGICAL: Trace patellar, medial hamstring, achilles reflexes which are symmetric bilaterally.  No ankle clonus bilaterally.  Sensation to light touch is intact in the bilateral L4, L5, and S1 dermatomes.

## 2022-07-07 ENCOUNTER — TELEPHONE (OUTPATIENT)
Dept: PHYSICAL MEDICINE AND REHAB | Facility: CLINIC | Age: 72
End: 2022-07-07

## 2022-07-07 ENCOUNTER — OFFICE VISIT (OUTPATIENT)
Dept: PHYSICAL MEDICINE AND REHAB | Facility: CLINIC | Age: 72
End: 2022-07-07
Payer: COMMERCIAL

## 2022-07-07 VITALS — TEMPERATURE: 98.4 F | DIASTOLIC BLOOD PRESSURE: 86 MMHG | HEART RATE: 91 BPM | SYSTOLIC BLOOD PRESSURE: 150 MMHG

## 2022-07-07 DIAGNOSIS — M47.816 FACET SYNDROME, LUMBAR: Primary | ICD-10-CM

## 2022-07-07 DIAGNOSIS — G89.29 CHRONIC BILATERAL LOW BACK PAIN WITHOUT SCIATICA: Primary | ICD-10-CM

## 2022-07-07 DIAGNOSIS — M47.816 LUMBAR FACET ARTHROPATHY: ICD-10-CM

## 2022-07-07 DIAGNOSIS — M47.816 LUMBAR FACET JOINT SYNDROME: ICD-10-CM

## 2022-07-07 DIAGNOSIS — M54.50 CHRONIC BILATERAL LOW BACK PAIN WITHOUT SCIATICA: Primary | ICD-10-CM

## 2022-07-07 PROCEDURE — 99214 OFFICE O/P EST MOD 30 MIN: CPT | Performed by: PHYSICAL MEDICINE & REHABILITATION

## 2022-07-07 ASSESSMENT — PAIN SCALES - GENERAL: PAINLEVEL: MODERATE PAIN (4)

## 2022-07-07 NOTE — TELEPHONE ENCOUNTER
"Patient called back. He is very frustrated and wants to know which insurance was contacted to find out if it is covered or not. Explained that this is done through our insurance verification team. As his [employer] is part of the Medicare plan, their guidelines are followed and FJIs are not covered. \"Do they know my history?!? Did they send them my records as to what has been done and what has been successful. This seems so redundant!\" Explained I would reach out to the PA team to see if there is anything else that can be done. Stated understanding and appreciation.   "

## 2022-07-07 NOTE — TELEPHONE ENCOUNTER
Patient calling back in to say he will do the MBBs tomorrow. Injection requirements reviewed in full with patient. Stated understanding.     Order placed in Epic for Bilateral L3, L4 and L5 Diagnostic Lumbar Medial Branch Blocks.

## 2022-07-07 NOTE — TELEPHONE ENCOUNTER
"Called and spoke with Lamin Yoon in financial clearance. She states that as he has -[facility]/Medicare no PA is done. They follow the patient's policy which states facet joint injections are not covered.     Discussed PSP wanting to know if a MBB from 2019 could be used. She states: \"would not risk using MBB from 2019. There is no date limitation for MBBs, but I feel that 2019 is too long ago. He should complete 2 MBBs two weeks apart to be on the safe side.     After completing the 2 MBBs with positive results (over 80% of pain relief), clinic can proceed with RFA (or FJI if RFA is contraindicated) right away.\"    "

## 2022-07-07 NOTE — TELEPHONE ENCOUNTER
Patient calling back. Asked clarification of what injections he has had and when these occurred. Every  injection and date of service given and explained why he would have had these. ESIs vs FJIs (and the set of MBBs).     He has decided now he does not want to wait through the process of MBBs and would like to have the repeat facet injections done tomorrow. He plans to pay out of pocket. Transferred to scheduling to adjust appointment.

## 2022-07-07 NOTE — TELEPHONE ENCOUNTER
"Phone call to patient to discuss. Explained the issue with the scheduled injections for tomorrow and the insurance coverage and requirements. Patient upset and wanting to know how he could find out how much it would cost for him to pay for it \"I am p++++d off. I am going to be losing thousands of dollars by being out of work more. I've done these blocks before and that's how we got to where we are now, doing the steroid injections.\" Explained that insurance plans can and do change their coverage and now they do not pay for facet injections. Cost of Care estimates number provided for him to call as well as the injection ordered. He will call and check this out.  "

## 2022-07-07 NOTE — LETTER
7/7/2022         RE: Ruperto Gomez  [address]  [place] [address]        Dear Colleague,    Thank you for referring your patient, Ruperto Gomez, to the [facility]. Please see a copy of my visit note below.    Assessment:   Ruperto Gomez (AKINDIA Kirby) is a 72 year old y.o. male with past medical history significant for hypercholesterolemia, benign prostatic hypertrophy, inguinal rectal fissure, osteoarthritis, polio (with left residual leg weakness/atrophy), tremors, restless leg syndrome who presents today for follow-up regarding acute flare up of bilateral low back pain without radicular/sciatica leg symptoms.  This started about 10 days ago after lifting something that weighed 70 lbs.  Pain is severe and significantly impacting his functioning/quality of life.   This is consistent with reaggravation of his lumbar facet joint syndrome secondary to lumbar facet arthropathy.  He has had 100% relief of his pain with previous bilateral L4-5 and L5-S1 facet joint injections, last performed in 2019.    PSP:  Dr. Gold       Plan:     A shared decision making plan was used.  The patient's values and choices were respected.  The following represents what was discussed and decided upon by the physician and the patient.      1.  DIAGNOSTIC TESTS:    -Patient's MRI of the lumbar spine performed at [facility] on December 3, 2018 was personally reviewed today by the physician with physician performing her own interpretation.  There is facet arthropathy seen at the L3-4, L4-5, L5-S1 levels.  There is bilateral L5-S1 foraminal stenosis with lateral recess stenosis.  There is an L5-S1 annular disc tear.  There is a disc bulge at the L4-5 level which does cause bilateral L4-5 lateral recess stenosis.  There is no need for updated imaging at this time given that he does not have radicular symptoms or red flag symptoms but if his pain does not improved as expected, an MRI can be  ordered.  2.  PHYSICAL THERAPY:   -The patient had previously declined to go to physical therapy stating it was financially prohibitive for him.  He has done physical therapy in the past and is encouraged to continue doing his home exercise program on a regular basis.  3.  MEDICATIONS:  No changes to his medications at this time.   -He can continue to alternate between the Aleve and ibuprofen.  -He had previously been prescribed gabapentin, but he stated he was too afraid to take it secondary to potential side effects  -He has tried and failed Celebrex secondary to ineffectiveness.  -He had previously tried and failed cyclobenzaprine secondary to side effects.  4.  INTERVENTIONS:    - I have requested urgent PA for repeat bilateral L4-5 and L5-S1 facet joint injections.  I do believe that RFA is relatively contraindicated given that he had such good relief with a less invasive procedure for almost 18 months.  The PA team did let me know (I spoke personally to a PA team member) that he is required to undergo MBBs before he can have repeat facet joint injections.  The nurse navigation team was asked to contact him to let him know about the MBB requirement and that injection tomorrow has to be changed to MBB.    -He is status post a right L4-5 transforaminal epidural steroid injection on July 6, 2021.  He reports that this gave him 100% relief of the right radicular leg pain and continues to give relief.  -He had previously been offered a right C4 and C5 medial branch block, but did not follow through with this.  The neck pain seems to be manageable at this time  -He reports greater than 75% relief with previous bilateral L4-5 transforaminal epidural steroid injections on March 10, 2021.  -He had previously had relief with bilateral L4-5 and L5/facet joint injections on February 4, 2021.  He reports that he had 100% relief of his pain following these injections for 17 months.    5.  PATIENT EDUCATION:    -All of his  questions were answered to his satisfaction today.  He was in agreement with the treatment plan  6.  FOLLOW-UP: Patient will follow up tomorrow for the bilateral L4-5 and L5-S1 facet joint injections and I would like to see him back 2 weeks after that to ensure that he is getting relief.  If there are any questions/concerns or any significant worsening of pain prior to that time, the patient is asked to call the clinic via the nurse navigation line or via [employer].     A total of 35 minutes was spent in the care of this patient on the date of service.    Subjective:     Ruperto Gomez (AKA Kofi)  is a 72 year old male who presents today for follow-up regarding acute flareup of bilateral low back pain..  Since the patient was last seen on July 6, 2021, he reports that he was having very good relief of his back and leg symptoms.  He reports that he lifted something that weighed about 70 lbs about 10 days ago and now he has the aggravation of his pain.  The pain continues to go across the low back with pain equal on both sides.  He does not have any radiation of pain going down the legs.  (No weakness in the legs.  He rates his pain today at a 6 out of 10.  At worst it is a 9/10.  Best is a 4 out of 10.  His pain remains severe at this point to the point that his functioning is affected and he cannot work.  He is wondering if he can have a repeat injection so he can return to work.  He says that he had 100% relief with the previous bilateral L4-5 and L5-S1 facet joint injections performed on 2-4-21 that lasted until 10 days ago when he lifted too heavy.  He is taking ibuprofen, and cyclobenzaprine as needed but these are not helping with pain.        Past medical history is reviewed and is unchanged for any new medical diagnoses in the interim.      Family history is reviewed and is unchanged in the interim.      Review of Systems:  Positive for headaches.  Pertinent negatives include no numbness, tingling, weakness,  fevers, chills, unexplained weight loss, bowel incontinence, bladder incontinence, trips, stumbles, falls.  All others reviewed and are negative.     Objective:   CONSTITUTIONAL:  Vital signs as above.  No acute distress.  The patient is well nourished and well groomed.    PSYCHIATRIC:  The patient is awake, alert, oriented to person, place and time.  The patient is answering questions appropriately with clear speech.  Normal affect.  SKIN:  Skin over the face, posterior torso, bilateral upper and lower extremities is clean, dry, intact without rashes.  MUSCULOSKELETAL:  Gait is non-antalgic.  The patient is able to heel and toe walk without any difficulty.  No significant tenderness over the bilateral lumbar paraspinal muscles.   He denies any pain with lumbar flexion.  He does have some pain chain with lumbar extension.  He has a pulling sensation with bilateral lumbar sidebending.  He also reports a stretching sensation with bilateral trunk rotation.  He does report mild reproduction of pain with bilateral lumbar facet loading (simultaneous extension rotation).   The patient has 5/5 strength for the bilateral hip flexors, knee flexors/extensors, ankle dorsiflexors/plantar flexors, ankle evertors/invertors.  Negative Fabere's test bilaterally.  NEUROLOGICAL: Trace patellar, medial hamstring, achilles reflexes which are symmetric bilaterally.  No ankle clonus bilaterally.  Sensation to light touch is intact in the bilateral L4, L5, and S1 dermatomes.               Again, thank you for allowing me to participate in the care of your patient.        Sincerely,        Lashawn Colvin, DO

## 2022-07-07 NOTE — TELEPHONE ENCOUNTER
Nurse navigation to please call him and let him know that we cannot perform repeat facet joint injections tomorrow.  Insurance requires him to undergo medial branch blocks.  Please find out if he would like to proceed with the medial branch blocks tomorrow (he is currently scheduled for lumbar facet joint injections) and if so, please place a new order for the MBBs and review education for MBBs.      I will have to check if his MBBs done in 2019 will count as one set or not and how long we have to wait after MBBs before we do anything else.  I will let him know as soon as the PA team lets us know.

## 2022-07-07 NOTE — PATIENT INSTRUCTIONS
Kofi,    I am sorry that you are having such severe pain.  I have asked our authorization team to expedite authorization.  I will let you know today if it is possible to do the injection today.  Otherwise please schedule your injection for tomorrow morning.  I will call you if for some reason we do not have authorization.    We would like for you to arrive at 1030 for your injection.    Repeat bilateral L4-5 facet joint injections have been ordered today.      Please note that this injection uses cortisone.  The cortisone may somewhat weaken the immune system.  It is unknown how much the immune system is weakened.  It is unknown if it is weakened to the point that you may be more likely to get the COVID-19 virus, or if you do get the COVID-19 virus, if you would be sicker than you would have been if you had not had the cortisone injection.  If you do not wish to proceed with the injection, please let the nurse/physician know and do NOT schedule the injection.    Please note that since your immune system is weakened from the cortisone, having the FLU or COVID-19 vaccine/shot may be less effective if you have a vaccine within 2 weeks from your cortisone injection.  It is advised to wait 2 weeks after your cortisone injection to have the  FLU or COVID-19 vaccine/shot (or if you have the vaccine/shot first, wait 2 weeks before you have the cortisone injection).    Please schedule this injection  On the day of your injection, you cannot be sick or taking antibiotics.  If you become sick and antibiotics are prescribed, please call the clinic so your injection can be rescheduled for once you have completed your antibiotics.  You will need to bring a  with you for your injection.   If you have any questions or concerns prior to your injection, please do not hesitate to call the nurse navigation line at [phone] or contact me through [employer].     Thank you!  Dr. Gold

## 2022-07-08 ENCOUNTER — RADIOLOGY INJECTION OFFICE VISIT (OUTPATIENT)
Dept: PHYSICAL MEDICINE AND REHAB | Facility: CLINIC | Age: 72
End: 2022-07-08
Attending: PHYSICAL MEDICINE & REHABILITATION

## 2022-07-08 VITALS
SYSTOLIC BLOOD PRESSURE: 126 MMHG | TEMPERATURE: 98.4 F | DIASTOLIC BLOOD PRESSURE: 84 MMHG | HEART RATE: 84 BPM | OXYGEN SATURATION: 95 %

## 2022-07-08 DIAGNOSIS — G89.29 CHRONIC BILATERAL LOW BACK PAIN WITHOUT SCIATICA: ICD-10-CM

## 2022-07-08 DIAGNOSIS — M54.50 CHRONIC BILATERAL LOW BACK PAIN WITHOUT SCIATICA: ICD-10-CM

## 2022-07-08 DIAGNOSIS — M47.816 LUMBAR FACET ARTHROPATHY: ICD-10-CM

## 2022-07-08 DIAGNOSIS — M47.816 LUMBAR FACET JOINT SYNDROME: ICD-10-CM

## 2022-07-08 PROCEDURE — 64494 INJ PARAVERT F JNT L/S 2 LEV: CPT | Mod: 50 | Performed by: PHYSICAL MEDICINE & REHABILITATION

## 2022-07-08 PROCEDURE — 64493 INJ PARAVERT F JNT L/S 1 LEV: CPT | Mod: 50 | Performed by: PHYSICAL MEDICINE & REHABILITATION

## 2022-07-08 PROCEDURE — A9576 INJ PROHANCE MULTIPACK: HCPCS | Performed by: PHYSICAL MEDICINE & REHABILITATION

## 2022-07-08 RX ORDER — METHYLPREDNISOLONE ACETATE 80 MG/ML
INJECTION, SUSPENSION INTRA-ARTICULAR; INTRALESIONAL; INTRAMUSCULAR; SOFT TISSUE
Status: COMPLETED | OUTPATIENT
Start: 2022-07-08 | End: 2022-07-08

## 2022-07-08 RX ORDER — ROPIVACAINE HYDROCHLORIDE 5 MG/ML
INJECTION, SOLUTION EPIDURAL; INFILTRATION; PERINEURAL
Status: COMPLETED | OUTPATIENT
Start: 2022-07-08 | End: 2022-07-08

## 2022-07-08 RX ADMIN — METHYLPREDNISOLONE ACETATE 80 MG: 80 INJECTION, SUSPENSION INTRA-ARTICULAR; INTRALESIONAL; INTRAMUSCULAR; SOFT TISSUE at 11:17

## 2022-07-08 RX ADMIN — ROPIVACAINE HYDROCHLORIDE 4 ML: 5 INJECTION, SOLUTION EPIDURAL; INFILTRATION; PERINEURAL at 11:16

## 2022-07-08 ASSESSMENT — PAIN SCALES - GENERAL
PAINLEVEL: MODERATE PAIN (4)
PAINLEVEL: MODERATE PAIN (5)

## 2022-07-08 NOTE — PATIENT INSTRUCTIONS
DISCHARGE INSTRUCTIONS    During office hours (8:00 a.m.- 4:00 p.m.) questions or concerns may be answered  by calling Spine Center Navigation Nurses at  [phone].  Messages received after hours will be returned the following business day.      In the case of an emergency, please dial 911 or seek assistance at the nearest Emergency Room/Urgent Care facility.     All Patients:  You may experience an increase in your symptoms for the first 2 days (It may take anywhere between 2 days- 2 weeks for the steroid to have maximum effect).    You may use ice on the injection site, as frequently as 20 minutes each hour if needed.    You may take your pain medicine.    You may continue taking your regular medication.    You may shower. No swimming, tub bath or hot tub for 48 hours.  You may remove your bandaid/bandage as soon as you are home.    You may resume light activities, as tolerated.    Resume your usual diet as tolerated.    It is strongly advised that you do not drive for 1-3 hours post injection.    If you have had oral sedation:  Do not drive for 8 hours post injection.      If you have had IV sedation:  Do not drive for 24 hours post injection.  Do not operate hazardous machinery or make important personal/business decisions for 24 hours.    POSSIBLE STEROID SIDE EFFECTS (If steroid/cortisone was used for your procedure)    -If you experience these symptoms, it should only last for a short period    Swelling of the legs              Skin redness (flushing)     Mouth (oral) irritation   Blood sugar (glucose) levels            Sweats                   Mood changes  Headache  Weakened immune system for up to 14 days, which could increase the risk of reynaldo the COVID-19 virus and/or experiencing more severe symptoms of the disease, if exposed.         POSSIBLE PROCEDURE SIDE EFFECTS    -Call the Spine Center if you are concerned    Increased Pain           Increased numbness/tingling      Nausea/Vomiting           Bruising/bleeding at site      Redness or swelling                                              Difficulty walking      Weakness          Fever greater than 100.5    *In the event of a severe headache after an epidural steroid injection that is relieved by lying down, please call the [facility] to speak with a clinical staff member*

## 2022-07-22 ENCOUNTER — TELEPHONE (OUTPATIENT)
Dept: PHYSICAL MEDICINE AND REHAB | Facility: CLINIC | Age: 72
End: 2022-07-22

## 2022-07-22 NOTE — TELEPHONE ENCOUNTER
PSP:  Dr. Gold  Last clinic visit:  7/8/2022  Reason for call: Follow-up call  Clinical information:  Call placed to pt to check on status after Bilateral L4-5, L5-S1 facet joint injections with Dr. Gold. Pt reports he is doing well. He reports 75-80% relief of his symptoms at this time.   Advice given to patient: Pt will follow-up as needed.   Provider to address: AMADEO

## 2022-10-12 ENCOUNTER — LAB REQUISITION (OUTPATIENT)
Dept: LAB | Facility: CLINIC | Age: 72
End: 2022-10-12

## 2022-10-12 DIAGNOSIS — Z87.898 PERSONAL HISTORY OF OTHER SPECIFIED CONDITIONS: ICD-10-CM

## 2022-10-12 LAB — PSA SERPL-MCNC: 4.28 NG/ML (ref 0–6.5)

## 2022-10-12 PROCEDURE — 84153 ASSAY OF PSA TOTAL: CPT | Performed by: FAMILY MEDICINE

## 2023-10-18 ENCOUNTER — LAB REQUISITION (OUTPATIENT)
Dept: LAB | Facility: CLINIC | Age: 73
End: 2023-10-18

## 2023-10-18 DIAGNOSIS — E78.2 MIXED HYPERLIPIDEMIA: ICD-10-CM

## 2023-10-18 DIAGNOSIS — Z13.1 ENCOUNTER FOR SCREENING FOR DIABETES MELLITUS: ICD-10-CM

## 2023-10-18 DIAGNOSIS — N40.0 BENIGN PROSTATIC HYPERPLASIA WITHOUT LOWER URINARY TRACT SYMPTOMS: ICD-10-CM

## 2023-10-18 LAB
ALBUMIN SERPL BCG-MCNC: 4.5 G/DL (ref 3.5–5.2)
ALP SERPL-CCNC: 70 U/L (ref 40–129)
ALT SERPL W P-5'-P-CCNC: 39 U/L (ref 0–70)
ANION GAP SERPL CALCULATED.3IONS-SCNC: 11 MMOL/L (ref 7–15)
AST SERPL W P-5'-P-CCNC: 27 U/L (ref 0–45)
BILIRUB SERPL-MCNC: 0.6 MG/DL
BUN SERPL-MCNC: 15.9 MG/DL (ref 8–23)
CALCIUM SERPL-MCNC: 9.5 MG/DL (ref 8.8–10.2)
CHLORIDE SERPL-SCNC: 103 MMOL/L (ref 98–107)
CHOLEST SERPL-MCNC: 287 MG/DL
CREAT SERPL-MCNC: 0.99 MG/DL (ref 0.67–1.17)
DEPRECATED HCO3 PLAS-SCNC: 26 MMOL/L (ref 22–29)
EGFRCR SERPLBLD CKD-EPI 2021: 80 ML/MIN/1.73M2
GLUCOSE SERPL-MCNC: 83 MG/DL (ref 70–99)
HDLC SERPL-MCNC: 67 MG/DL
LDLC SERPL CALC-MCNC: 190 MG/DL
NONHDLC SERPL-MCNC: 220 MG/DL
POTASSIUM SERPL-SCNC: 4.6 MMOL/L (ref 3.4–5.3)
PROT SERPL-MCNC: 7.1 G/DL (ref 6.4–8.3)
PSA SERPL DL<=0.01 NG/ML-MCNC: 5.27 NG/ML (ref 0–6.5)
SODIUM SERPL-SCNC: 140 MMOL/L (ref 135–145)
TRIGL SERPL-MCNC: 148 MG/DL

## 2023-10-18 PROCEDURE — 80061 LIPID PANEL: CPT | Performed by: FAMILY MEDICINE

## 2023-10-18 PROCEDURE — G0103 PSA SCREENING: HCPCS | Performed by: FAMILY MEDICINE

## 2023-10-18 PROCEDURE — 80053 COMPREHEN METABOLIC PANEL: CPT | Performed by: FAMILY MEDICINE

## 2024-11-08 ENCOUNTER — LAB REQUISITION (OUTPATIENT)
Dept: LAB | Facility: CLINIC | Age: 74
End: 2024-11-08

## 2024-11-08 DIAGNOSIS — M18.11 UNILATERAL PRIMARY OSTEOARTHRITIS OF FIRST CARPOMETACARPAL JOINT, RIGHT HAND: ICD-10-CM

## 2024-11-08 LAB
ANION GAP SERPL CALCULATED.3IONS-SCNC: 13 MMOL/L (ref 7–15)
BUN SERPL-MCNC: 14.4 MG/DL (ref 8–23)
CALCIUM SERPL-MCNC: 9.3 MG/DL (ref 8.8–10.4)
CHLORIDE SERPL-SCNC: 103 MMOL/L (ref 98–107)
CREAT SERPL-MCNC: 0.96 MG/DL (ref 0.67–1.17)
EGFRCR SERPLBLD CKD-EPI 2021: 83 ML/MIN/1.73M2
GLUCOSE SERPL-MCNC: 101 MG/DL (ref 70–99)
HCO3 SERPL-SCNC: 22 MMOL/L (ref 22–29)
POTASSIUM SERPL-SCNC: 4.4 MMOL/L (ref 3.4–5.3)
SODIUM SERPL-SCNC: 138 MMOL/L (ref 135–145)

## 2024-11-08 PROCEDURE — 82947 ASSAY GLUCOSE BLOOD QUANT: CPT | Performed by: FAMILY MEDICINE

## 2024-11-08 PROCEDURE — 80048 BASIC METABOLIC PNL TOTAL CA: CPT | Performed by: FAMILY MEDICINE

## 2025-06-26 ENCOUNTER — TELEPHONE (OUTPATIENT)
Dept: PHYSICAL MEDICINE AND REHAB | Facility: CLINIC | Age: 75
End: 2025-06-26

## 2025-06-26 ENCOUNTER — OFFICE VISIT (OUTPATIENT)
Dept: PHYSICAL MEDICINE AND REHAB | Facility: CLINIC | Age: 75
End: 2025-06-26
Payer: COMMERCIAL

## 2025-06-26 VITALS
BODY MASS INDEX: 28.3 KG/M2 | WEIGHT: 186.7 LBS | HEART RATE: 66 BPM | SYSTOLIC BLOOD PRESSURE: 136 MMHG | DIASTOLIC BLOOD PRESSURE: 70 MMHG | HEIGHT: 68 IN

## 2025-06-26 DIAGNOSIS — M47.816 LUMBAR FACET ARTHROPATHY: ICD-10-CM

## 2025-06-26 DIAGNOSIS — M54.16 LUMBAR RADICULAR PAIN: Primary | ICD-10-CM

## 2025-06-26 RX ORDER — METHYLPREDNISOLONE 4 MG/1
TABLET ORAL
Qty: 21 TABLET | Refills: 1 | Status: SHIPPED | OUTPATIENT
Start: 2025-06-26

## 2025-06-26 ASSESSMENT — PAIN SCALES - GENERAL: PAINLEVEL_OUTOF10: SEVERE PAIN (8)

## 2025-06-26 NOTE — PROGRESS NOTES
Assessment:   Ruperto Gomez is a 75 year old y.o. male with past medical history significant for  hypercholesterolemia, benign prostatic hypertrophy, inguinal rectal fissure, osteoarthritis, polio (with left residual leg weakness/atrophy), tremors, restless leg syndrome  who presents today for follow-up regarding a flare of chronic low back pain now radiating into the left lower extremity.  Pain flared up 1 week ago after sleeping on his side in a recliner.  An MRI lumbar spine from 2018 shows multilevel spondylosis.  Current symptoms are likely related to both lower lumbar facet arthropathy as well as left L3/L4 radiculitis.  Patient has chronic weakness left lower extremity related to polio.  He denies any new weakness in the left leg since pain flared up.  Current pain is severe.       Plan:     A shared decision making plan was used.  The patient's values and choices were respected.  The following represents what was discussed and decided upon by the physician assistant and the patient.      1.  DIAGNOSTIC TESTS:    - I reviewed the MRI lumbar spine from [facility] from December 3, 2018.  - I ordered an updated MRI lumbar spine for further evaluation.    2.  PHYSICAL THERAPY: I offered a referral to physical therapy.  He declined.  Patient will continue home exercise program.    3.  MEDICATIONS:  - Patient is leaving for a trip to [place] in 8 days.  He is concerned about his ability to manage his pain.  I provided a prescription for a Medrol Dosepak.  I did add 1 refill in case he wants to take a refill up to [place] with him.  He should not take other NSAIDs while he is taking the Medrol Dosepak.  He can resume NSAIDs after he completes the Medrol Dosepak.    4.  INTERVENTIONS: No interventions were ordered.  I explained that patient should have an updated MRI lumbar spine before pursuing any interventional pain management.  Current scan is almost 7 years old.  His pain has also switched sides from right leg pain to  left leg pain.  I am concerned about worsening spondylosis.  - If he continues to have left leg pain I would recommend an epidural steroid injection on the left targeting either the L3 or L4 nerve root, depending on his MRI results.  - In regards to the axial low back pain we could consider medial branch blocks as a workup toward radiofrequency ablation.  - I did confirm with our insurance team that his insurance no longer covers lumbar facet joint injections unless radiofrequency ablation is contraindicated.  He does not have any contraindications to radiofrequency ablation.  Patient did indicate he may be interested in paying cash for the facet joint injections since he has had a positive response to facet joint injections in the past.  I will reach out to our insurance and to see the process for this.    5.  PATIENT EDUCATION: Patient is in agreement the above plan.  All questions were answered.    6.  FOLLOW-UP: Patient will follow-up with me as needed if his pain fails to improve.  If he has questions or concerns in the meantime, he should not hesitate to call    Subjective:     Ruperto Gomez is a 75 year old male who presents today for follow-up regarding a flare of chronic low back pain now radiating into the left lower extremity.  Patient historically has had low back pain with radiation into the right lower extremity.  He has had a positive response to interventional pain management in the past.  Patient was last at our clinic for bilateral L4-5, L5-S1 facet joint injections July 8, 2022.  These injections were very helpful.  He has also had a positive response to epidural steroid injections on the right at L4-5.  Patient reports that he was doing well up until 1 week ago when pain flared up.  He states that he slept on his side on a recliner.  He has had left-sided low back pain now radiating into the left leg ever since.  He is leaving for a trip to [place] next week and would like to get some relief of  his pain before he leaves.    Patient complains of left greater than right low back pain.  Pain spans across low back in a broadband distribution at the belt line.  Pain intermittently radiates into the buttock/lateral hip, wraps around the lateral hip, and extends down the anterior thigh to the knee.  Denies any pain distal to the knee.  He has chronic numbness and tingling in his feet which is stable.  Denies any new numbness or tingling.  He has chronic left leg weakness related to polio.  Denies any new weakness.  Denies loss of bowel or bladder control.  Denies fevers.  He rates his pain today as an 8 out of 10.  At its worst it is a 9 out of 10.  At its best it is a 6 out of 10.  Pain is aggravated with walking and alleviated with sitting.  Denies any right leg pain currently.    Treatment to date:  - Bilateral L4-5, L5-S1 facet joint injections July 8, 2022  - Right L4-5 transforaminal epidural steroid injection July 6, 2021  - Bilateral L4-5 transforaminal epidural steroid injections March 10, 2021  - Bilateral L4-5, L5-S1 facet joint injections February 4, 2021  - Right L4-5 transforaminal epidural steroid injection February 8, 2019  - Bilateral L4-5, L5-S1 facet joint injections January 25, 2019  - Bilateral L3, L4, L5 medial branch blocks January 24, 2019  -Celebrex-no help  - Cyclobenzaprine-side effects  - Aleve  - Ibuprofen somewhat helpful    Review of Systems:  Positive for numbness/tingling, headache.  Negative for weakness, loss of bowel/bladder control, inability to urinate, pain much worse at night, trip/double/falls, difficulty swallowing, difficulty with hand skills, fevers, unintentional weight loss     Objective:   CONSTITUTIONAL:  Vital signs as above.  No acute distress.  The patient is well nourished and well groomed.    PSYCHIATRIC:  The patient is awake, alert, oriented to person, place and time.  The patient is answering questions appropriately with clear speech.  Normal affect.  HEENT:  Normocephalic, atraumatic.  Sclera clear.    SKIN:  Skin over the face, posterior torso, bilateral upper and lower extremities is clean, dry, intact without rashes.  VASCULAR: No significant lower extremity edema.  MUSCULOSKELETAL:  Gait is abnormal due to left leg weakness.  Unable to toe walk on the left.  Able to heel walk bilaterally.  The patient has 5/5 strength for the bilateral hip flexors, knee flexors/extensors, ankle dorsiflexors/plantar flexors, ankle evertors/invertors.  Lumbar facet maneuvers increased low back pain.  NEUROLOGICAL: Absent patellar, achilles reflexes which are symmetric bilaterally.  No ankle clonus bilaterally.  Sensation to light touch is intact in the bilateral L4, L5, and S1 dermatomes.       RESULTS: I reviewed the MRI lumbar spine from [facility] dated December 3, 2018.  This shows a right sided L5-S1 disc herniation with right S1 nerve root impingement.  There is also foraminal stenosis on the left with left L5 ganglionic impingement.  At L4-5 there is disc degeneration and a disc protrusion.  There are prominent facet joint effusions at L3-4.  There is also facet arthropathy at L5-S1 and L4-5.

## 2025-06-26 NOTE — TELEPHONE ENCOUNTER
"PSP:  Previously seen per Dr. Gold  Last clinic visit:  last seen 7/7/22; 7/8/22 Bilateral L4-L5 and L5-S1 FJIs  Reason for call: Return of low back pain. \"Same as it has been when seen for a few injections there in past.\"  Clinical information:  Patient states all the same symptoms. Hoping to get some relief as he has a big trip planned (leaving 7/4/25).  Advice given to patient: Explained as it has been almost 3 years since seen, he would need to be evaluated by a provider to determine course of treatment. Offered appointment with LOLI Jie Long PA-C.     "

## 2025-06-26 NOTE — LETTER
6/26/2025      Ruperto Gomez  [address] [facility] [address]      Dear Colleague,    Thank you for referring your patient, Ruperto Gomez, to the [facility]. Please see a copy of my visit note below.    Assessment:   Ruperto Gomez is a 75 year old y.o. male with past medical history significant for  hypercholesterolemia, benign prostatic hypertrophy, inguinal rectal fissure, osteoarthritis, polio (with left residual leg weakness/atrophy), tremors, restless leg syndrome  who presents today for follow-up regarding a flare of chronic low back pain now radiating into the left lower extremity.  Pain flared up 1 week ago after sleeping on his side in a recliner.  An MRI lumbar spine from 2018 shows multilevel spondylosis.  Current symptoms are likely related to both lower lumbar facet arthropathy as well as left L3/L4 radiculitis.  Patient has chronic weakness left lower extremity related to polio.  He denies any new weakness in the left leg since pain flared up.  Current pain is severe.       Plan:     A shared decision making plan was used.  The patient's values and choices were respected.  The following represents what was discussed and decided upon by the physician assistant and the patient.      1.  DIAGNOSTIC TESTS:    - I reviewed the MRI lumbar spine from [facility] from December 3, 2018.  - I ordered an updated MRI lumbar spine for further evaluation.    2.  PHYSICAL THERAPY: I offered a referral to physical therapy.  He declined.  Patient will continue home exercise program.    3.  MEDICATIONS:  - Patient is leaving for a trip to [place] in 8 days.  He is concerned about his ability to manage his pain.  I provided a prescription for a Medrol Dosepak.  I did add 1 refill in case he wants to take a refill up to [place] with him.  He should not take other NSAIDs while he is taking the Medrol Dosepak.  He can resume NSAIDs after he completes the Medrol Dosepak.    4.   INTERVENTIONS: No interventions were ordered.  I explained that patient should have an updated MRI lumbar spine before pursuing any interventional pain management.  Current scan is almost 7 years old.  His pain has also switched sides from right leg pain to left leg pain.  I am concerned about worsening spondylosis.  - If he continues to have left leg pain I would recommend an epidural steroid injection on the left targeting either the L3 or L4 nerve root, depending on his MRI results.  - In regards to the axial low back pain we could consider medial branch blocks as a workup toward radiofrequency ablation.  - I did confirm with our insurance team that his insurance no longer covers lumbar facet joint injections unless radiofrequency ablation is contraindicated.  He does not have any contraindications to radiofrequency ablation.  Patient did indicate he may be interested in paying cash for the facet joint injections since he has had a positive response to facet joint injections in the past.  I will reach out to our insurance and to see the process for this.    5.  PATIENT EDUCATION: Patient is in agreement the above plan.  All questions were answered.    6.  FOLLOW-UP: Patient will follow-up with me as needed if his pain fails to improve.  If he has questions or concerns in the meantime, he should not hesitate to call    Subjective:     Ruperto Gomez is a 75 year old male who presents today for follow-up regarding a flare of chronic low back pain now radiating into the left lower extremity.  Patient historically has had low back pain with radiation into the right lower extremity.  He has had a positive response to interventional pain management in the past.  Patient was last at our clinic for bilateral L4-5, L5-S1 facet joint injections July 8, 2022.  These injections were very helpful.  He has also had a positive response to epidural steroid injections on the right at L4-5.  Patient reports that he was doing well  up until 1 week ago when pain flared up.  He states that he slept on his side on a recliner.  He has had left-sided low back pain now radiating into the left leg ever since.  He is leaving for a trip to [place] next week and would like to get some relief of his pain before he leaves.    Patient complains of left greater than right low back pain.  Pain spans across low back in a broadband distribution at the belt line.  Pain intermittently radiates into the buttock/lateral hip, wraps around the lateral hip, and extends down the anterior thigh to the knee.  Denies any pain distal to the knee.  He has chronic numbness and tingling in his feet which is stable.  Denies any new numbness or tingling.  He has chronic left leg weakness related to polio.  Denies any new weakness.  Denies loss of bowel or bladder control.  Denies fevers.  He rates his pain today as an 8 out of 10.  At its worst it is a 9 out of 10.  At its best it is a 6 out of 10.  Pain is aggravated with walking and alleviated with sitting.  Denies any right leg pain currently.    Treatment to date:  - Bilateral L4-5, L5-S1 facet joint injections July 8, 2022  - Right L4-5 transforaminal epidural steroid injection July 6, 2021  - Bilateral L4-5 transforaminal epidural steroid injections March 10, 2021  - Bilateral L4-5, L5-S1 facet joint injections February 4, 2021  - Right L4-5 transforaminal epidural steroid injection February 8, 2019  - Bilateral L4-5, L5-S1 facet joint injections January 25, 2019  - Bilateral L3, L4, L5 medial branch blocks January 24, 2019  -Celebrex-no help  - Cyclobenzaprine-side effects  - Aleve  - Ibuprofen somewhat helpful    Review of Systems:  Positive for numbness/tingling, headache.  Negative for weakness, loss of bowel/bladder control, inability to urinate, pain much worse at night, trip/double/falls, difficulty swallowing, difficulty with hand skills, fevers, unintentional weight loss     Objective:   CONSTITUTIONAL:  Vital  signs as above.  No acute distress.  The patient is well nourished and well groomed.    PSYCHIATRIC:  The patient is awake, alert, oriented to person, place and time.  The patient is answering questions appropriately with clear speech.  Normal affect.  HEENT: Normocephalic, atraumatic.  Sclera clear.    SKIN:  Skin over the face, posterior torso, bilateral upper and lower extremities is clean, dry, intact without rashes.  VASCULAR: No significant lower extremity edema.  MUSCULOSKELETAL:  Gait is abnormal due to left leg weakness.  Unable to toe walk on the left.  Able to heel walk bilaterally.  The patient has 5/5 strength for the bilateral hip flexors, knee flexors/extensors, ankle dorsiflexors/plantar flexors, ankle evertors/invertors.  Lumbar facet maneuvers increased low back pain.  NEUROLOGICAL: Absent patellar, achilles reflexes which are symmetric bilaterally.  No ankle clonus bilaterally.  Sensation to light touch is intact in the bilateral L4, L5, and S1 dermatomes.       RESULTS: I reviewed the MRI lumbar spine from [facility] dated December 3, 2018.  This shows a right sided L5-S1 disc herniation with right S1 nerve root impingement.  There is also foraminal stenosis on the left with left L5 ganglionic impingement.  At L4-5 there is disc degeneration and a disc protrusion.  There are prominent facet joint effusions at L3-4.  There is also facet arthropathy at L5-S1 and L4-5.      Again, thank you for allowing me to participate in the care of your patient.        Sincerely,        Jie Long PA-C    Electronically signed

## 2025-06-26 NOTE — PATIENT INSTRUCTIONS
[facility] Scheduling    Please call [phone] to schedule your image(s) (select option#1).

## 2025-06-30 ENCOUNTER — HOSPITAL ENCOUNTER (OUTPATIENT)
Dept: MRI IMAGING | Facility: HOSPITAL | Age: 75
Discharge: HOME | End: 2025-06-30
Attending: PHYSICIAN ASSISTANT | Admitting: PHYSICIAN ASSISTANT
Payer: COMMERCIAL

## 2025-06-30 DIAGNOSIS — M54.16 LUMBAR RADICULAR PAIN: ICD-10-CM

## 2025-06-30 PROCEDURE — 72148 MRI LUMBAR SPINE W/O DYE: CPT

## 2025-07-01 ENCOUNTER — RESULTS FOLLOW-UP (OUTPATIENT)
Dept: PHYSICAL MEDICINE AND REHAB | Facility: CLINIC | Age: 75
End: 2025-07-01

## 2025-07-01 DIAGNOSIS — M47.816 SPONDYLOSIS OF LUMBAR REGION WITHOUT MYELOPATHY OR RADICULOPATHY: Primary | ICD-10-CM

## 2025-07-30 ENCOUNTER — RADIOLOGY INJECTION OFFICE VISIT (OUTPATIENT)
Dept: PHYSICAL MEDICINE AND REHAB | Facility: CLINIC | Age: 75
End: 2025-07-30
Attending: PHYSICIAN ASSISTANT

## 2025-07-30 VITALS
OXYGEN SATURATION: 95 % | HEIGHT: 68 IN | DIASTOLIC BLOOD PRESSURE: 76 MMHG | RESPIRATION RATE: 16 BRPM | WEIGHT: 186 LBS | BODY MASS INDEX: 28.19 KG/M2 | SYSTOLIC BLOOD PRESSURE: 124 MMHG | HEART RATE: 64 BPM | TEMPERATURE: 98.6 F

## 2025-07-30 DIAGNOSIS — M47.816 SPONDYLOSIS OF LUMBAR REGION WITHOUT MYELOPATHY OR RADICULOPATHY: ICD-10-CM

## 2025-07-30 PROCEDURE — 64493 INJ PARAVERT F JNT L/S 1 LEV: CPT | Mod: 50 | Performed by: PAIN MEDICINE

## 2025-07-30 PROCEDURE — 64494 INJ PARAVERT F JNT L/S 2 LEV: CPT | Mod: 50 | Performed by: PAIN MEDICINE

## 2025-07-30 RX ORDER — LIDOCAINE HYDROCHLORIDE 10 MG/ML
INJECTION, SOLUTION EPIDURAL; INFILTRATION; INTRACAUDAL; PERINEURAL
Status: COMPLETED | OUTPATIENT
Start: 2025-07-30 | End: 2025-07-30

## 2025-07-30 RX ORDER — ROPIVACAINE HYDROCHLORIDE 5 MG/ML
INJECTION, SOLUTION EPIDURAL; INFILTRATION; PERINEURAL
Status: COMPLETED | OUTPATIENT
Start: 2025-07-30 | End: 2025-07-30

## 2025-07-30 RX ORDER — TRIAMCINOLONE ACETONIDE 40 MG/ML
INJECTION, SUSPENSION INTRA-ARTICULAR; INTRAMUSCULAR
Status: COMPLETED | OUTPATIENT
Start: 2025-07-30 | End: 2025-07-30

## 2025-07-30 RX ADMIN — LIDOCAINE HYDROCHLORIDE 4 ML: 10 INJECTION, SOLUTION EPIDURAL; INFILTRATION; INTRACAUDAL; PERINEURAL at 16:14

## 2025-07-30 RX ADMIN — ROPIVACAINE HYDROCHLORIDE 5 ML: 5 INJECTION, SOLUTION EPIDURAL; INFILTRATION; PERINEURAL at 16:15

## 2025-07-30 RX ADMIN — TRIAMCINOLONE ACETONIDE 40 MG: 40 INJECTION, SUSPENSION INTRA-ARTICULAR; INTRAMUSCULAR at 16:15

## 2025-07-30 ASSESSMENT — PAIN SCALES - GENERAL
PAINLEVEL_OUTOF10: NO PAIN (0)
PAINLEVEL_OUTOF10: SEVERE PAIN (7)

## 2025-07-30 NOTE — PATIENT INSTRUCTIONS
Follow-up visit in 2-4 weeks with LUCAS Holland to discuss injection outcome and determine care plan going forward.       DISCHARGE INSTRUCTIONS    During office hours (8:00 a.m.- 4:00 p.m.) questions or concerns may be answered  by calling [facility] Navigation Nurses at  [phone].  Messages received after hours will be returned the following business day.      In the case of an emergency, please dial 911 or seek assistance at the nearest Emergency Room/Urgent Care facility.     All Patients:    You may experience an increase in your symptoms for the first 2 days (It may take anywhere between 2 days - 2 weeks for the steroid to have maximum effect).    You may use ice on the injection site, as frequently as 20 minutes each hour if needed.    You may take your pain medicine.    You may continue taking your regular medication after your injection. If you have had a medial branch block you may resume pain medication once your pain diary is completed.    You may shower. No swimming, tub bath, or hot tub for 48 hours.  You may remove your bandaid/bandage as soon as you are home.    You may resume light activities, as tolerated.    Resume your usual diet as tolerated.    If you were told to hold any blood thinning medications you may resume taking them 24 hours after your procedure as prescribed.    It is strongly advised that you do not drive for 1-3 hours post injection.    If you have had oral sedation:  Do not drive for 8 hours post injection.        POSSIBLE STEROID SIDE EFFECTS (If steroid/cortisone was used for your procedure    Swelling of the legs              Skin redness (flushing)     Mouth (oral) irritation   Increased blood sugar (glucose) levels            Sweats                    Mood changes  Headache  Sleeplessness  Weakened immune system for up to 14 days, which could increase the risk of reynaldo the COVID-19 virus and/or experiencing more severe symptoms of the disease, if  exposed.  Decreased effectiveness of vaccines if given within 2 weeks of the steroid.    -If you experience these symptoms, it should only last for a short period         POSSIBLE PROCEDURE SIDE EFFECTS    Increased Pain           Increased numbness/tingling      Nausea/Vomiting          Bruising/bleeding at site      Redness or swelling                                              Difficulty walking      Weakness           Fever greater than 100.5    -Call the Spine Center if you are concerned    *In the event of a severe headache after an epidural steroid injection that is relieved by lying down, please call the [facility] to speak with a clinical staff member*

## 2025-08-28 ENCOUNTER — TELEPHONE (OUTPATIENT)
Dept: PHYSICAL MEDICINE AND REHAB | Facility: CLINIC | Age: 75
End: 2025-08-28